# Patient Record
Sex: FEMALE | Race: WHITE | NOT HISPANIC OR LATINO | Employment: FULL TIME | URBAN - METROPOLITAN AREA
[De-identification: names, ages, dates, MRNs, and addresses within clinical notes are randomized per-mention and may not be internally consistent; named-entity substitution may affect disease eponyms.]

---

## 2017-03-20 ENCOUNTER — OFFICE VISIT (OUTPATIENT)
Dept: URGENT CARE | Facility: CLINIC | Age: 47
End: 2017-03-20
Payer: COMMERCIAL

## 2017-03-20 PROCEDURE — 99203 OFFICE O/P NEW LOW 30 MIN: CPT

## 2018-04-04 LAB
T4 FREE SERPL-MCNC: 1.08 NG/DL (ref 0.6–1.7)
TSH SERPL DL<=0.05 MIU/L-ACNC: 6.15 UIU/M (ref 0.45–5.33)

## 2018-05-16 ENCOUNTER — TRANSCRIBE ORDERS (OUTPATIENT)
Dept: LAB | Facility: CLINIC | Age: 48
End: 2018-05-16

## 2018-05-16 ENCOUNTER — APPOINTMENT (OUTPATIENT)
Dept: LAB | Facility: CLINIC | Age: 48
End: 2018-05-16
Payer: COMMERCIAL

## 2018-05-16 DIAGNOSIS — D64.9 ANEMIA, UNSPECIFIED TYPE: Primary | ICD-10-CM

## 2018-05-16 DIAGNOSIS — Z79.899 NEED FOR PROPHYLACTIC CHEMOTHERAPY: ICD-10-CM

## 2018-05-16 DIAGNOSIS — D64.9 ANEMIA, UNSPECIFIED TYPE: ICD-10-CM

## 2018-05-16 LAB
ALBUMIN SERPL BCP-MCNC: 3.4 G/DL (ref 3.5–5)
ALP SERPL-CCNC: 75 U/L (ref 46–116)
ALT SERPL W P-5'-P-CCNC: 29 U/L (ref 12–78)
ANION GAP SERPL CALCULATED.3IONS-SCNC: 8 MMOL/L (ref 4–13)
AST SERPL W P-5'-P-CCNC: 14 U/L (ref 5–45)
BASOPHILS # BLD AUTO: 0.05 THOUSANDS/ΜL (ref 0–0.1)
BASOPHILS NFR BLD AUTO: 1 % (ref 0–1)
BILIRUB SERPL-MCNC: 0.32 MG/DL (ref 0.2–1)
BUN SERPL-MCNC: 17 MG/DL (ref 5–25)
CALCIUM SERPL-MCNC: 9.2 MG/DL (ref 8.3–10.1)
CHLORIDE SERPL-SCNC: 104 MMOL/L (ref 100–108)
CO2 SERPL-SCNC: 25 MMOL/L (ref 21–32)
CREAT SERPL-MCNC: 0.79 MG/DL (ref 0.6–1.3)
EOSINOPHIL # BLD AUTO: 0.1 THOUSAND/ΜL (ref 0–0.61)
EOSINOPHIL NFR BLD AUTO: 1 % (ref 0–6)
ERYTHROCYTE [DISTWIDTH] IN BLOOD BY AUTOMATED COUNT: 14.4 % (ref 11.6–15.1)
FERRITIN SERPL-MCNC: 7 NG/ML (ref 8–388)
GFR SERPL CREATININE-BSD FRML MDRD: 89 ML/MIN/1.73SQ M
GLUCOSE P FAST SERPL-MCNC: 76 MG/DL (ref 65–99)
HCT VFR BLD AUTO: 43.7 % (ref 34.8–46.1)
HGB BLD-MCNC: 13.9 G/DL (ref 11.5–15.4)
IMM GRANULOCYTES # BLD AUTO: 0.07 THOUSAND/UL (ref 0–0.2)
IMM GRANULOCYTES NFR BLD AUTO: 1 % (ref 0–2)
IRON SATN MFR SERPL: 82 %
IRON SERPL-MCNC: 326 UG/DL (ref 50–170)
LYMPHOCYTES # BLD AUTO: 2.67 THOUSANDS/ΜL (ref 0.6–4.47)
LYMPHOCYTES NFR BLD AUTO: 24 % (ref 14–44)
MCH RBC QN AUTO: 29.2 PG (ref 26.8–34.3)
MCHC RBC AUTO-ENTMCNC: 31.8 G/DL (ref 31.4–37.4)
MCV RBC AUTO: 92 FL (ref 82–98)
MONOCYTES # BLD AUTO: 0.54 THOUSAND/ΜL (ref 0.17–1.22)
MONOCYTES NFR BLD AUTO: 5 % (ref 4–12)
NEUTROPHILS # BLD AUTO: 7.66 THOUSANDS/ΜL (ref 1.85–7.62)
NEUTS SEG NFR BLD AUTO: 69 % (ref 43–75)
NRBC BLD AUTO-RTO: 0 /100 WBCS
PLATELET # BLD AUTO: 315 THOUSANDS/UL (ref 149–390)
PMV BLD AUTO: 10.3 FL (ref 8.9–12.7)
POTASSIUM SERPL-SCNC: 4.4 MMOL/L (ref 3.5–5.3)
PROT SERPL-MCNC: 7.4 G/DL (ref 6.4–8.2)
RBC # BLD AUTO: 4.76 MILLION/UL (ref 3.81–5.12)
SODIUM SERPL-SCNC: 137 MMOL/L (ref 136–145)
TIBC SERPL-MCNC: 399 UG/DL (ref 250–450)
WBC # BLD AUTO: 11.09 THOUSAND/UL (ref 4.31–10.16)

## 2018-05-16 PROCEDURE — 83550 IRON BINDING TEST: CPT

## 2018-05-16 PROCEDURE — 85025 COMPLETE CBC W/AUTO DIFF WBC: CPT

## 2018-05-16 PROCEDURE — 83540 ASSAY OF IRON: CPT

## 2018-05-16 PROCEDURE — 36415 COLL VENOUS BLD VENIPUNCTURE: CPT

## 2018-05-16 PROCEDURE — 80053 COMPREHEN METABOLIC PANEL: CPT

## 2018-05-16 PROCEDURE — 82747 ASSAY OF FOLIC ACID RBC: CPT

## 2018-05-16 PROCEDURE — 82728 ASSAY OF FERRITIN: CPT

## 2018-05-18 LAB
FOLATE BLD-MCNC: 514 NG/ML
FOLATE RBC-MCNC: 1266 NG/ML
HCT VFR BLD AUTO: 40.6 % (ref 34–46.6)

## 2019-01-14 ENCOUNTER — TRANSCRIBE ORDERS (OUTPATIENT)
Dept: LAB | Facility: CLINIC | Age: 49
End: 2019-01-14

## 2019-01-14 ENCOUNTER — APPOINTMENT (OUTPATIENT)
Dept: LAB | Facility: CLINIC | Age: 49
End: 2019-01-14
Payer: COMMERCIAL

## 2019-01-14 DIAGNOSIS — E10.8 TYPE 1 DIABETES MELLITUS WITH COMPLICATION (HCC): ICD-10-CM

## 2019-01-14 DIAGNOSIS — E55.9 AVITAMINOSIS D: ICD-10-CM

## 2019-01-14 DIAGNOSIS — R53.83 FATIGUE, UNSPECIFIED TYPE: Primary | ICD-10-CM

## 2019-01-14 DIAGNOSIS — Z79.899 NEED FOR PROPHYLACTIC CHEMOTHERAPY: ICD-10-CM

## 2019-01-14 DIAGNOSIS — R53.83 FATIGUE, UNSPECIFIED TYPE: ICD-10-CM

## 2019-01-14 DIAGNOSIS — D64.9 ANEMIA, UNSPECIFIED TYPE: ICD-10-CM

## 2019-01-14 LAB
25(OH)D3 SERPL-MCNC: 22 NG/ML (ref 30–100)
ALBUMIN SERPL BCP-MCNC: 3.7 G/DL (ref 3.5–5)
ALP SERPL-CCNC: 65 U/L (ref 46–116)
ALT SERPL W P-5'-P-CCNC: 24 U/L (ref 12–78)
ANION GAP SERPL CALCULATED.3IONS-SCNC: 6 MMOL/L (ref 4–13)
AST SERPL W P-5'-P-CCNC: 13 U/L (ref 5–45)
BILIRUB SERPL-MCNC: 0.45 MG/DL (ref 0.2–1)
BUN SERPL-MCNC: 17 MG/DL (ref 5–25)
CALCIUM SERPL-MCNC: 8.6 MG/DL (ref 8.3–10.1)
CHLORIDE SERPL-SCNC: 105 MMOL/L (ref 100–108)
CK SERPL-CCNC: 55 U/L (ref 26–192)
CO2 SERPL-SCNC: 24 MMOL/L (ref 21–32)
CREAT SERPL-MCNC: 0.79 MG/DL (ref 0.6–1.3)
ERYTHROCYTE [DISTWIDTH] IN BLOOD BY AUTOMATED COUNT: 12.7 % (ref 11.6–15.1)
ERYTHROCYTE [SEDIMENTATION RATE] IN BLOOD: 11 MM/HOUR (ref 0–20)
EST. AVERAGE GLUCOSE BLD GHB EST-MCNC: 97 MG/DL
FERRITIN SERPL-MCNC: 8 NG/ML (ref 8–388)
GFR SERPL CREATININE-BSD FRML MDRD: 88 ML/MIN/1.73SQ M
GLUCOSE SERPL-MCNC: 80 MG/DL (ref 65–140)
HBA1C MFR BLD: 5 % (ref 4.2–6.3)
HCT VFR BLD AUTO: 40.8 % (ref 34.8–46.1)
HGB BLD-MCNC: 14.2 G/DL (ref 11.5–15.4)
IRON SATN MFR SERPL: 15 %
IRON SERPL-MCNC: 58 UG/DL (ref 50–170)
MCH RBC QN AUTO: 33.3 PG (ref 26.8–34.3)
MCHC RBC AUTO-ENTMCNC: 34.8 G/DL (ref 31.4–37.4)
MCV RBC AUTO: 96 FL (ref 82–98)
PLATELET # BLD AUTO: 313 THOUSANDS/UL (ref 149–390)
PMV BLD AUTO: 10.2 FL (ref 8.9–12.7)
POTASSIUM SERPL-SCNC: 3.9 MMOL/L (ref 3.5–5.3)
PROT SERPL-MCNC: 7.2 G/DL (ref 6.4–8.2)
RBC # BLD AUTO: 4.27 MILLION/UL (ref 3.81–5.12)
SODIUM SERPL-SCNC: 135 MMOL/L (ref 136–145)
T4 FREE SERPL-MCNC: 1.51 NG/DL (ref 0.76–1.46)
TIBC SERPL-MCNC: 387 UG/DL (ref 250–450)
TSH SERPL DL<=0.05 MIU/L-ACNC: 1.12 UIU/ML (ref 0.36–3.74)
WBC # BLD AUTO: 9.41 THOUSAND/UL (ref 4.31–10.16)

## 2019-01-14 PROCEDURE — 83550 IRON BINDING TEST: CPT

## 2019-01-14 PROCEDURE — 84439 ASSAY OF FREE THYROXINE: CPT

## 2019-01-14 PROCEDURE — 82550 ASSAY OF CK (CPK): CPT

## 2019-01-14 PROCEDURE — 85652 RBC SED RATE AUTOMATED: CPT

## 2019-01-14 PROCEDURE — 85027 COMPLETE CBC AUTOMATED: CPT

## 2019-01-14 PROCEDURE — 86618 LYME DISEASE ANTIBODY: CPT

## 2019-01-14 PROCEDURE — 80053 COMPREHEN METABOLIC PANEL: CPT

## 2019-01-14 PROCEDURE — 83036 HEMOGLOBIN GLYCOSYLATED A1C: CPT

## 2019-01-14 PROCEDURE — 83540 ASSAY OF IRON: CPT

## 2019-01-14 PROCEDURE — 82747 ASSAY OF FOLIC ACID RBC: CPT

## 2019-01-14 PROCEDURE — 82306 VITAMIN D 25 HYDROXY: CPT

## 2019-01-14 PROCEDURE — 36415 COLL VENOUS BLD VENIPUNCTURE: CPT

## 2019-01-14 PROCEDURE — 86617 LYME DISEASE ANTIBODY: CPT

## 2019-01-14 PROCEDURE — 84443 ASSAY THYROID STIM HORMONE: CPT

## 2019-01-14 PROCEDURE — 82728 ASSAY OF FERRITIN: CPT

## 2019-01-16 LAB
B BURGDOR IGG SER IA-ACNC: 0.02
B BURGDOR IGM SER IA-ACNC: 0.85
FOLATE BLD-MCNC: 499.1 NG/ML
FOLATE RBC-MCNC: 1283 NG/ML
HCT VFR BLD AUTO: 38.9 % (ref 34–46.6)

## 2019-01-17 LAB
B BURGDOR IGG PATRN SER IB-IMP: NEGATIVE
B BURGDOR IGM PATRN SER IB-IMP: NEGATIVE
B BURGDOR18KD IGG SER QL IB: NORMAL
B BURGDOR23KD IGG SER QL IB: NORMAL
B BURGDOR23KD IGM SER QL IB: NORMAL
B BURGDOR28KD IGG SER QL IB: NORMAL
B BURGDOR30KD IGG SER QL IB: NORMAL
B BURGDOR39KD IGG SER QL IB: NORMAL
B BURGDOR39KD IGM SER QL IB: NORMAL
B BURGDOR41KD IGG SER QL IB: NORMAL
B BURGDOR41KD IGM SER QL IB: NORMAL
B BURGDOR45KD IGG SER QL IB: NORMAL
B BURGDOR58KD IGG SER QL IB: NORMAL
B BURGDOR66KD IGG SER QL IB: NORMAL
B BURGDOR93KD IGG SER QL IB: NORMAL

## 2019-11-20 ENCOUNTER — HOSPITAL ENCOUNTER (INPATIENT)
Facility: HOSPITAL | Age: 49
LOS: 7 days | Discharge: HOME | DRG: 881 | End: 2019-11-27
Attending: EMERGENCY MEDICINE | Admitting: PSYCHIATRY & NEUROLOGY
Payer: COMMERCIAL

## 2019-11-20 DIAGNOSIS — I10 HYPERTENSION: ICD-10-CM

## 2019-11-20 DIAGNOSIS — E55.9 VITAMIN D DEFICIENCY: ICD-10-CM

## 2019-11-20 DIAGNOSIS — Z00.8 MEDICAL CLEARANCE FOR PSYCHIATRIC ADMISSION: ICD-10-CM

## 2019-11-20 DIAGNOSIS — K59.00 CONSTIPATION: ICD-10-CM

## 2019-11-20 DIAGNOSIS — E03.9 HYPOTHYROID: ICD-10-CM

## 2019-11-20 DIAGNOSIS — Z00.8 MEDICAL CLEARANCE FOR PSYCHIATRIC ADMISSION: Primary | ICD-10-CM

## 2019-11-20 DIAGNOSIS — F32.2 CURRENT SEVERE EPISODE OF MAJOR DEPRESSIVE DISORDER WITHOUT PSYCHOTIC FEATURES WITHOUT PRIOR EPISODE (HCC): Primary | ICD-10-CM

## 2019-11-20 DIAGNOSIS — F19.10 SUBSTANCE ABUSE (HCC): ICD-10-CM

## 2019-11-20 DIAGNOSIS — F32.A DEPRESSION: ICD-10-CM

## 2019-11-20 DIAGNOSIS — R45.851 SUICIDAL IDEATIONS: ICD-10-CM

## 2019-11-20 LAB
ALBUMIN SERPL BCP-MCNC: 3.9 G/DL (ref 3.5–5.7)
ALP SERPL-CCNC: 60 U/L (ref 40–150)
ALT SERPL W P-5'-P-CCNC: 16 U/L (ref 7–52)
AMPHETAMINES SERPL QL SCN: POSITIVE
ANION GAP SERPL CALCULATED.3IONS-SCNC: 5 MMOL/L (ref 4–13)
AST SERPL W P-5'-P-CCNC: 11 U/L (ref 13–39)
BARBITURATES UR QL: NEGATIVE
BASOPHILS # BLD AUTO: 0.1 THOUSANDS/ΜL (ref 0–0.1)
BASOPHILS NFR BLD AUTO: 1 % (ref 0–2)
BENZODIAZ UR QL: POSITIVE
BILIRUB SERPL-MCNC: 0.3 MG/DL (ref 0.2–1)
BILIRUB UR QL STRIP: NEGATIVE
BUN SERPL-MCNC: 12 MG/DL (ref 7–25)
CALCIUM SERPL-MCNC: 8.8 MG/DL (ref 8.6–10.5)
CHLORIDE SERPL-SCNC: 104 MMOL/L (ref 98–107)
CLARITY UR: ABNORMAL
CO2 SERPL-SCNC: 30 MMOL/L (ref 21–31)
COCAINE UR QL: NEGATIVE
COLOR UR: YELLOW
CREAT SERPL-MCNC: 0.76 MG/DL (ref 0.6–1.2)
EOSINOPHIL # BLD AUTO: 0 THOUSAND/ΜL (ref 0–0.61)
EOSINOPHIL NFR BLD AUTO: 1 % (ref 0–5)
ERYTHROCYTE [DISTWIDTH] IN BLOOD BY AUTOMATED COUNT: 14 % (ref 11.5–14.5)
ETHANOL SERPL-MCNC: <10 MG/DL
EXT PREG TEST URINE: NEGATIVE
EXT. CONTROL ED NAV: NORMAL
GFR SERPL CREATININE-BSD FRML MDRD: 92 ML/MIN/1.73SQ M
GLUCOSE SERPL-MCNC: 87 MG/DL (ref 65–99)
GLUCOSE UR STRIP-MCNC: NEGATIVE MG/DL
HCT VFR BLD AUTO: 40.8 % (ref 42–47)
HGB BLD-MCNC: 14 G/DL (ref 12–16)
HGB UR QL STRIP.AUTO: NEGATIVE
KETONES UR STRIP-MCNC: NEGATIVE MG/DL
LEUKOCYTE ESTERASE UR QL STRIP: NEGATIVE
LYMPHOCYTES # BLD AUTO: 1.9 THOUSANDS/ΜL (ref 0.6–4.47)
LYMPHOCYTES NFR BLD AUTO: 22 % (ref 21–51)
MCH RBC QN AUTO: 31.8 PG (ref 26–34)
MCHC RBC AUTO-ENTMCNC: 34.4 G/DL (ref 31–37)
MCV RBC AUTO: 93 FL (ref 81–99)
METHADONE UR QL: NEGATIVE
MONOCYTES # BLD AUTO: 0.4 THOUSAND/ΜL (ref 0.17–1.22)
MONOCYTES NFR BLD AUTO: 5 % (ref 2–12)
NEUTROPHILS # BLD AUTO: 6.3 THOUSANDS/ΜL (ref 1.4–6.5)
NEUTS SEG NFR BLD AUTO: 72 % (ref 42–75)
NITRITE UR QL STRIP: NEGATIVE
OPIATES UR QL SCN: NEGATIVE
PCP UR QL: NEGATIVE
PH UR STRIP.AUTO: 8 [PH]
PLATELET # BLD AUTO: 296 THOUSANDS/UL (ref 149–390)
PMV BLD AUTO: 7.4 FL (ref 8.6–11.7)
POTASSIUM SERPL-SCNC: 3.8 MMOL/L (ref 3.5–5.5)
PROT SERPL-MCNC: 6.4 G/DL (ref 6.4–8.9)
PROT UR STRIP-MCNC: NEGATIVE MG/DL
RBC # BLD AUTO: 4.41 MILLION/UL (ref 3.9–5.2)
SODIUM SERPL-SCNC: 139 MMOL/L (ref 134–143)
SP GR UR STRIP.AUTO: 1.01 (ref 1–1.03)
THC UR QL: NEGATIVE
TSH SERPL DL<=0.05 MIU/L-ACNC: 0.03 UIU/ML (ref 0.45–5.33)
UROBILINOGEN UR QL STRIP.AUTO: 0.2 E.U./DL
WBC # BLD AUTO: 8.7 THOUSAND/UL (ref 4.8–10.8)

## 2019-11-20 PROCEDURE — 99285 EMERGENCY DEPT VISIT HI MDM: CPT

## 2019-11-20 PROCEDURE — 99285 EMERGENCY DEPT VISIT HI MDM: CPT | Performed by: EMERGENCY MEDICINE

## 2019-11-20 PROCEDURE — 80053 COMPREHEN METABOLIC PANEL: CPT

## 2019-11-20 PROCEDURE — G0480 DRUG TEST DEF 1-7 CLASSES: HCPCS

## 2019-11-20 PROCEDURE — 80320 DRUG SCREEN QUANTALCOHOLS: CPT

## 2019-11-20 PROCEDURE — 81003 URINALYSIS AUTO W/O SCOPE: CPT

## 2019-11-20 PROCEDURE — 80307 DRUG TEST PRSMV CHEM ANLYZR: CPT | Performed by: INTERNAL MEDICINE

## 2019-11-20 PROCEDURE — 84443 ASSAY THYROID STIM HORMONE: CPT

## 2019-11-20 PROCEDURE — 81025 URINE PREGNANCY TEST: CPT | Performed by: INTERNAL MEDICINE

## 2019-11-20 PROCEDURE — 85025 COMPLETE CBC W/AUTO DIFF WBC: CPT

## 2019-11-20 PROCEDURE — 93005 ELECTROCARDIOGRAM TRACING: CPT

## 2019-11-20 PROCEDURE — 36415 COLL VENOUS BLD VENIPUNCTURE: CPT

## 2019-11-20 RX ORDER — HYDROXYZINE HYDROCHLORIDE 25 MG/1
25 TABLET, FILM COATED ORAL EVERY 6 HOURS PRN
Status: CANCELLED | OUTPATIENT
Start: 2019-11-20

## 2019-11-20 RX ORDER — MAGNESIUM HYDROXIDE/ALUMINUM HYDROXICE/SIMETHICONE 120; 1200; 1200 MG/30ML; MG/30ML; MG/30ML
30 SUSPENSION ORAL EVERY 4 HOURS PRN
Status: CANCELLED | OUTPATIENT
Start: 2019-11-20

## 2019-11-20 RX ORDER — TRAZODONE HYDROCHLORIDE 50 MG/1
50 TABLET ORAL
Status: DISCONTINUED | OUTPATIENT
Start: 2019-11-20 | End: 2019-11-27 | Stop reason: HOSPADM

## 2019-11-20 RX ORDER — ALPRAZOLAM 0.5 MG/1
TABLET ORAL 3 TIMES DAILY PRN
Status: ON HOLD | COMMUNITY
End: 2019-11-26 | Stop reason: SDUPTHER

## 2019-11-20 RX ORDER — BENZTROPINE MESYLATE 1 MG/ML
1 INJECTION INTRAMUSCULAR; INTRAVENOUS EVERY 6 HOURS PRN
Status: DISCONTINUED | OUTPATIENT
Start: 2019-11-20 | End: 2019-11-27 | Stop reason: HOSPADM

## 2019-11-20 RX ORDER — IBUPROFEN 400 MG/1
800 TABLET ORAL EVERY 8 HOURS PRN
Status: CANCELLED | OUTPATIENT
Start: 2019-11-20

## 2019-11-20 RX ORDER — HALOPERIDOL 5 MG
5 TABLET ORAL EVERY 6 HOURS PRN
Status: DISCONTINUED | OUTPATIENT
Start: 2019-11-20 | End: 2019-11-27 | Stop reason: HOSPADM

## 2019-11-20 RX ORDER — BENZTROPINE MESYLATE 1 MG/1
1 TABLET ORAL EVERY 6 HOURS PRN
Status: CANCELLED | OUTPATIENT
Start: 2019-11-20

## 2019-11-20 RX ORDER — LEVOTHYROXINE SODIUM 0.07 MG/1
150 TABLET ORAL
Status: DISCONTINUED | OUTPATIENT
Start: 2019-11-21 | End: 2019-11-21

## 2019-11-20 RX ORDER — ACETAMINOPHEN 325 MG/1
650 TABLET ORAL EVERY 6 HOURS PRN
Status: DISCONTINUED | OUTPATIENT
Start: 2019-11-20 | End: 2019-11-27 | Stop reason: HOSPADM

## 2019-11-20 RX ORDER — RISPERIDONE 1 MG/1
1 TABLET, ORALLY DISINTEGRATING ORAL EVERY 6 HOURS PRN
Status: DISCONTINUED | OUTPATIENT
Start: 2019-11-20 | End: 2019-11-27 | Stop reason: HOSPADM

## 2019-11-20 RX ORDER — IBUPROFEN 800 MG/1
800 TABLET ORAL EVERY 8 HOURS PRN
Status: DISCONTINUED | OUTPATIENT
Start: 2019-11-20 | End: 2019-11-27 | Stop reason: HOSPADM

## 2019-11-20 RX ORDER — HYDROXYZINE HYDROCHLORIDE 25 MG/1
25 TABLET, FILM COATED ORAL EVERY 6 HOURS PRN
Status: DISCONTINUED | OUTPATIENT
Start: 2019-11-20 | End: 2019-11-21

## 2019-11-20 RX ORDER — BENZTROPINE MESYLATE 1 MG/1
1 TABLET ORAL EVERY 6 HOURS PRN
Status: DISCONTINUED | OUTPATIENT
Start: 2019-11-20 | End: 2019-11-27 | Stop reason: HOSPADM

## 2019-11-20 RX ORDER — HALOPERIDOL 5 MG
5 TABLET ORAL EVERY 6 HOURS PRN
Status: CANCELLED | OUTPATIENT
Start: 2019-11-20

## 2019-11-20 RX ORDER — ACETAMINOPHEN 325 MG/1
650 TABLET ORAL EVERY 6 HOURS PRN
Status: CANCELLED | OUTPATIENT
Start: 2019-11-20

## 2019-11-20 RX ORDER — IBUPROFEN 600 MG/1
600 TABLET ORAL EVERY 8 HOURS PRN
Status: DISCONTINUED | OUTPATIENT
Start: 2019-11-20 | End: 2019-11-27 | Stop reason: HOSPADM

## 2019-11-20 RX ORDER — LEVOTHYROXINE SODIUM 0.15 MG/1
150 TABLET ORAL DAILY
COMMUNITY
End: 2019-11-27 | Stop reason: HOSPADM

## 2019-11-20 RX ORDER — TRAZODONE HYDROCHLORIDE 50 MG/1
50 TABLET ORAL
Status: CANCELLED | OUTPATIENT
Start: 2019-11-20

## 2019-11-20 RX ORDER — IBUPROFEN 600 MG/1
600 TABLET ORAL EVERY 8 HOURS PRN
Status: CANCELLED | OUTPATIENT
Start: 2019-11-20

## 2019-11-20 RX ORDER — MAGNESIUM HYDROXIDE/ALUMINUM HYDROXICE/SIMETHICONE 120; 1200; 1200 MG/30ML; MG/30ML; MG/30ML
30 SUSPENSION ORAL EVERY 4 HOURS PRN
Status: DISCONTINUED | OUTPATIENT
Start: 2019-11-20 | End: 2019-11-27 | Stop reason: HOSPADM

## 2019-11-20 RX ORDER — LISINOPRIL 20 MG/1
20 TABLET ORAL
Status: ON HOLD | COMMUNITY
End: 2019-11-26 | Stop reason: SDUPTHER

## 2019-11-20 RX ORDER — BENZTROPINE MESYLATE 1 MG/ML
1 INJECTION INTRAMUSCULAR; INTRAVENOUS EVERY 6 HOURS PRN
Status: CANCELLED | OUTPATIENT
Start: 2019-11-20

## 2019-11-20 RX ORDER — RISPERIDONE 1 MG/1
1 TABLET, ORALLY DISINTEGRATING ORAL EVERY 6 HOURS PRN
Status: CANCELLED | OUTPATIENT
Start: 2019-11-20

## 2019-11-20 RX ORDER — HALOPERIDOL 5 MG/ML
5 INJECTION INTRAMUSCULAR EVERY 6 HOURS PRN
Status: DISCONTINUED | OUTPATIENT
Start: 2019-11-20 | End: 2019-11-27 | Stop reason: HOSPADM

## 2019-11-20 RX ORDER — LISINOPRIL 20 MG/1
20 TABLET ORAL
Status: DISCONTINUED | OUTPATIENT
Start: 2019-11-20 | End: 2019-11-27 | Stop reason: HOSPADM

## 2019-11-20 RX ORDER — HALOPERIDOL 5 MG/ML
5 INJECTION INTRAMUSCULAR EVERY 6 HOURS PRN
Status: CANCELLED | OUTPATIENT
Start: 2019-11-20

## 2019-11-20 RX ADMIN — TRAZODONE HYDROCHLORIDE 50 MG: 50 TABLET ORAL at 22:35

## 2019-11-20 RX ADMIN — LISINOPRIL 20 MG: 20 TABLET ORAL at 22:35

## 2019-11-20 RX ADMIN — HYDROXYZINE HYDROCHLORIDE 25 MG: 25 TABLET ORAL at 23:55

## 2019-11-20 NOTE — ED NOTES
Anabel Frausto from crisis is in at bedside with pt for eval and intake at this time       Miguel Valdes  11/20/19 6929

## 2019-11-20 NOTE — ED PROVIDER NOTES
Pt Name: Joe Watters  MRN: [id number]  [place] 1970  Age/Sex: 47 y o  female  Date of evaluation: 2019  PCP: Wicho Bryan MD    CHIEF COMPLAINT    Chief Complaint   Patient presents with    Psychiatric Evaluation     Patient greiving daughter and   both of which  in the past 4 years  patient is contemplating suicide by overdose  HPI    Alexa Randle presents to the Emergency Department complaining of feeling depressed and suicidal   She lost her 12year old daughter in a car accident  Then she lost her  to a drug overdose  Since then she has been seeing a sarah who is using drugs and she has been recreationally using meth with him  HPI      Past Medical and Surgical History    Past Medical History:   Diagnosis Date    Disease of thyroid gland     Hypertension        History reviewed  No pertinent surgical history  History reviewed  No pertinent family history  Social History     Tobacco Use    Smoking status: Current Every Day Smoker     Types: E-Cigarettes    Smokeless tobacco: Never Used   Substance Use Topics    Alcohol use: Yes     Comment: social    Drug use: Yes     Types: Methamphetamines     Comment: last used 2 days ago           Allergies    No Known Allergies    Home Medications    Prior to Admission medications    Not on File           Review of Systems    Review of Systems   Constitutional: Negative for activity change, appetite change, chills, diaphoresis, fatigue and fever  HENT: Negative for congestion, postnasal drip, rhinorrhea, sinus pressure, sneezing and sore throat  Eyes: Negative for pain and visual disturbance  Respiratory: Negative for cough, chest tightness and shortness of breath  Cardiovascular: Negative for chest pain, palpitations and leg swelling  Gastrointestinal: Negative for abdominal distention, abdominal pain, constipation, diarrhea, nausea and vomiting     Endocrine: Negative for polydipsia, polyphagia and polyuria  Genitourinary: Negative for decreased urine volume, difficulty urinating, dysuria, flank pain, frequency and hematuria  Musculoskeletal: Negative for arthralgias, gait problem, joint swelling and neck pain  Skin: Negative for pallor and rash  Allergic/Immunologic: Negative for immunocompromised state  Neurological: Negative for syncope, speech difficulty, weakness, light-headedness, numbness and headaches  Psychiatric/Behavioral: Positive for suicidal ideas  The patient is nervous/anxious  All other systems reviewed and are negative  Physical Exam      ED Triage Vitals [11/20/19 1622]   Temperature Pulse Respirations Blood Pressure SpO2   98 1 °F (36 7 °C) 100 16 150/81 100 %      Temp Source Heart Rate Source Patient Position - Orthostatic VS BP Location FiO2 (%)   Temporal Monitor Sitting Left arm --      Pain Score       No Pain               Physical Exam   Constitutional: She is oriented to person, place, and time  She appears well-developed and well-nourished  No distress  HENT:   Head: Normocephalic and atraumatic  Nose: Nose normal    Mouth/Throat: Oropharynx is clear and moist    Eyes: Pupils are equal, round, and reactive to light  Conjunctivae, EOM and lids are normal    Neck: Normal range of motion  Neck supple  Cardiovascular: Normal rate, regular rhythm and normal heart sounds  Exam reveals no gallop and no friction rub  No murmur heard  Pulmonary/Chest: Effort normal and breath sounds normal  No accessory muscle usage  No respiratory distress  She has no wheezes  She has no rales  Abdominal: Soft  She exhibits no distension  There is no tenderness  There is no rebound and no guarding  Neurological: She is alert and oriented to person, place, and time  No cranial nerve deficit or sensory deficit  Skin: Skin is warm and dry  No rash noted  She is not diaphoretic  No erythema     Psychiatric: Her speech is normal and behavior is normal  Judgment normal  Her mood appears anxious  She exhibits a depressed mood  She expresses suicidal ideation  She expresses suicidal plans  Nursing note and vitals reviewed  Assessment and Plan    Angelia Tamez is a 47 y o  female who presents with depression and suicidal thoughts  Physical examination unremarkable  Based on evaluation patient is at risk for self-harm  Patient is willing to sign 201  Would need 302 commitment if unwilling to continue with 201  Diagnostics reviewed and patient is medically cleared  Crisis has been contacted and their evaluation is pending  Care of patient transferred to incoming team       MDM  Number of Diagnoses or Management Options  Diagnosis management comments: Patient is medically cleared for inpatient psychiatric evaluation and treatment  Diagnostic Results    EKG:  normal EKG, normal sinus rhythm    EKG INTERPRETATION  EKG Interpretation    Rate: 97 BPM  Rhythm: sinus  Axis: normal  Intervals: Normal, no blocks, QTc 449ms  Q waves: normal  T waves: normal  ST segments: normal    Impression: normal EKG  EKG for comparison: not immediately available  EKG interpreted by me  Interpretation by Yelena Leach DO  EKG reviewed and interpreted independently      Labs:    Results for orders placed or performed during the hospital encounter of 11/20/19   Rapid drug screen, urine   Result Value Ref Range    Amph/Meth UR Positive (A) Negative    Barbiturate Ur Negative Negative    Benzodiazepine Urine Positive (A) Negative    Cocaine Urine Negative Negative    Methadone Urine Negative Negative    Opiate Urine Negative Negative    PCP Ur Negative Negative    THC Urine Negative Negative   UA (URINE) with reflex to Scope   Result Value Ref Range    Color, UA Yellow Yellow, Straw    Clarity, UA Slightly Cloudy (A) Hazy, Clear    Specific Gravity, UA 1 015 >1 005-<1 030    pH, UA 8 0 (A) 5 0, 5 5, 6 0, 6 5, 7 0, 7 5    Leukocytes, UA Negative Negative    Nitrite, UA Negative Negative    Protein, UA Negative Negative, Interference- unable to analyze mg/dl    Glucose, UA Negative Negative mg/dl    Ketones, UA Negative Negative mg/dl    Urobilinogen, UA 0 2 0 2, 1 0 E U /dl E U /dl    Bilirubin, UA Negative Negative    Blood, UA Negative Negative   Ethanol   Result Value Ref Range    Ethanol Lvl <10 <10 mg/dL   TSH   Result Value Ref Range    TSH 3RD GENERATON 0 030 (L) 0 450 - 5 330 uIU/mL   CBC and differential   Result Value Ref Range    WBC 8 70 4 80 - 10 80 Thousand/uL    RBC 4 41 3 90 - 5 20 Million/uL    Hemoglobin 14 0 12 0 - 16 0 g/dL    Hematocrit 40 8 (L) 42 0 - 47 0 %    MCV 93 81 - 99 fL    MCH 31 8 26 0 - 34 0 pg    MCHC 34 4 31 0 - 37 0 g/dL    RDW 14 0 11 5 - 14 5 %    MPV 7 4 (L) 8 6 - 11 7 fL    Platelets 535 969 - 231 Thousands/uL    Neutrophils Relative 72 42 - 75 %    Lymphocytes Relative 22 21 - 51 %    Monocytes Relative 5 2 - 12 %    Eosinophils Relative 1 0 - 5 %    Basophils Relative 1 0 - 2 %    Neutrophils Absolute 6 30 1 40 - 6 50 Thousands/µL    Lymphocytes Absolute 1 90 0 60 - 4 47 Thousands/µL    Monocytes Absolute 0 40 0 17 - 1 22 Thousand/µL    Eosinophils Absolute 0 00 0 00 - 0 61 Thousand/µL    Basophils Absolute 0 10 0 00 - 0 10 Thousands/µL   Comprehensive metabolic panel   Result Value Ref Range    Sodium 139 134 - 143 mmol/L    Potassium 3 8 3 5 - 5 5 mmol/L    Chloride 104 98 - 107 mmol/L    CO2 30 21 - 31 mmol/L    ANION GAP 5 4 - 13 mmol/L    BUN 12 7 - 25 mg/dL    Creatinine 0 76 0 60 - 1 20 mg/dL    Glucose 87 65 - 99 mg/dL    Calcium 8 8 8 6 - 10 5 mg/dL    AST 11 (L) 13 - 39 U/L    ALT 16 7 - 52 U/L    Alkaline Phosphatase 60 40 - 150 U/L    Total Protein 6 4 6 4 - 8 9 g/dL    Albumin 3 9 3 5 - 5 7 g/dL    Total Bilirubin 0 30 0 20 - 1 00 mg/dL    eGFR 92 ml/min/1 73sq m   POCT pregnancy, urine   Result Value Ref Range    EXT PREG TEST UR (Ref: Negative) negative     Control valid        All labs reviewed and utilized in the medical decision making process    Radiology:    No orders to display       All radiology studies independently viewed by me and interpreted by the radiologist     Procedure    Procedures      ED Course of Care and Re-Assessments        Medications - No data to display        FINAL IMPRESSION    Final diagnoses:   Depression   Suicidal ideations   Substance abuse ([facility] )            Hannah Mcclure, [address] [facility]  11/25/19 5859

## 2019-11-21 PROBLEM — F13.10 BENZODIAZEPINE ABUSE (HCC): Status: ACTIVE | Noted: 2019-11-21

## 2019-11-21 PROBLEM — F15.90 STIMULANT USE DISORDER: Status: ACTIVE | Noted: 2019-11-21

## 2019-11-21 PROBLEM — F32.A DEPRESSION: Status: ACTIVE | Noted: 2019-11-21

## 2019-11-21 LAB
ALBUMIN SERPL BCP-MCNC: 3.6 G/DL (ref 3.5–5.7)
ALP SERPL-CCNC: 55 U/L (ref 40–150)
ALT SERPL W P-5'-P-CCNC: 15 U/L (ref 7–52)
ANION GAP SERPL CALCULATED.3IONS-SCNC: 6 MMOL/L (ref 4–13)
AST SERPL W P-5'-P-CCNC: 10 U/L (ref 13–39)
ATRIAL RATE: 97 BPM
BASOPHILS # BLD AUTO: 0.1 THOUSANDS/ΜL (ref 0–0.1)
BASOPHILS NFR BLD AUTO: 1 % (ref 0–2)
BILIRUB SERPL-MCNC: 0.4 MG/DL (ref 0.2–1)
BUN SERPL-MCNC: 12 MG/DL (ref 7–25)
CALCIUM SERPL-MCNC: 8.8 MG/DL (ref 8.6–10.5)
CHLORIDE SERPL-SCNC: 107 MMOL/L (ref 98–107)
CHOLEST SERPL-MCNC: 150 MG/DL (ref 0–200)
CO2 SERPL-SCNC: 27 MMOL/L (ref 21–31)
CREAT SERPL-MCNC: 0.66 MG/DL (ref 0.6–1.2)
EOSINOPHIL # BLD AUTO: 0.1 THOUSAND/ΜL (ref 0–0.61)
EOSINOPHIL NFR BLD AUTO: 2 % (ref 0–5)
ERYTHROCYTE [DISTWIDTH] IN BLOOD BY AUTOMATED COUNT: 14 % (ref 11.5–14.5)
GFR SERPL CREATININE-BSD FRML MDRD: 103 ML/MIN/1.73SQ M
GLUCOSE SERPL-MCNC: 85 MG/DL (ref 65–99)
HCT VFR BLD AUTO: 39.3 % (ref 42–47)
HDLC SERPL-MCNC: 46 MG/DL
HGB BLD-MCNC: 13.6 G/DL (ref 12–16)
LDLC SERPL CALC-MCNC: 83 MG/DL (ref 0–100)
LYMPHOCYTES # BLD AUTO: 3.1 THOUSANDS/ΜL (ref 0.6–4.47)
LYMPHOCYTES NFR BLD AUTO: 33 % (ref 21–51)
MCH RBC QN AUTO: 31.9 PG (ref 26–34)
MCHC RBC AUTO-ENTMCNC: 34.6 G/DL (ref 31–37)
MCV RBC AUTO: 92 FL (ref 81–99)
MONOCYTES # BLD AUTO: 0.5 THOUSAND/ΜL (ref 0.17–1.22)
MONOCYTES NFR BLD AUTO: 5 % (ref 2–12)
NEUTROPHILS # BLD AUTO: 5.5 THOUSANDS/ΜL (ref 1.4–6.5)
NEUTS SEG NFR BLD AUTO: 59 % (ref 42–75)
NONHDLC SERPL-MCNC: 104 MG/DL
P AXIS: 55 DEGREES
PLATELET # BLD AUTO: 272 THOUSANDS/UL (ref 149–390)
PMV BLD AUTO: 7.3 FL (ref 8.6–11.7)
POTASSIUM SERPL-SCNC: 3.9 MMOL/L (ref 3.5–5.5)
PR INTERVAL: 144 MS
PROT SERPL-MCNC: 6.2 G/DL (ref 6.4–8.9)
QRS AXIS: 13 DEGREES
QRSD INTERVAL: 86 MS
QT INTERVAL: 354 MS
QTC INTERVAL: 449 MS
RBC # BLD AUTO: 4.27 MILLION/UL (ref 3.9–5.2)
RPR SER QL: NORMAL
SODIUM SERPL-SCNC: 140 MMOL/L (ref 134–143)
T WAVE AXIS: 51 DEGREES
TRIGL SERPL-MCNC: 107 MG/DL (ref 44–166)
TSH SERPL DL<=0.05 MIU/L-ACNC: 0.05 UIU/ML (ref 0.45–5.33)
VENTRICULAR RATE: 97 BPM
WBC # BLD AUTO: 9.3 THOUSAND/UL (ref 4.8–10.8)

## 2019-11-21 PROCEDURE — 99222 1ST HOSP IP/OBS MODERATE 55: CPT | Performed by: PSYCHIATRY & NEUROLOGY

## 2019-11-21 PROCEDURE — 84443 ASSAY THYROID STIM HORMONE: CPT | Performed by: PSYCHIATRY & NEUROLOGY

## 2019-11-21 PROCEDURE — 80061 LIPID PANEL: CPT | Performed by: PSYCHIATRY & NEUROLOGY

## 2019-11-21 PROCEDURE — 80053 COMPREHEN METABOLIC PANEL: CPT | Performed by: PSYCHIATRY & NEUROLOGY

## 2019-11-21 PROCEDURE — 85025 COMPLETE CBC W/AUTO DIFF WBC: CPT | Performed by: PSYCHIATRY & NEUROLOGY

## 2019-11-21 PROCEDURE — 86592 SYPHILIS TEST NON-TREP QUAL: CPT | Performed by: PSYCHIATRY & NEUROLOGY

## 2019-11-21 PROCEDURE — 93010 ELECTROCARDIOGRAM REPORT: CPT | Performed by: INTERNAL MEDICINE

## 2019-11-21 RX ORDER — ESCITALOPRAM OXALATE 10 MG/1
10 TABLET ORAL DAILY
Status: DISCONTINUED | OUTPATIENT
Start: 2019-11-21 | End: 2019-11-27 | Stop reason: HOSPADM

## 2019-11-21 RX ORDER — HYDROXYZINE HYDROCHLORIDE 25 MG/1
50 TABLET, FILM COATED ORAL EVERY 6 HOURS PRN
Status: DISCONTINUED | OUTPATIENT
Start: 2019-11-21 | End: 2019-11-27 | Stop reason: HOSPADM

## 2019-11-21 RX ORDER — GABAPENTIN 100 MG/1
100 CAPSULE ORAL 3 TIMES DAILY
Status: DISCONTINUED | OUTPATIENT
Start: 2019-11-21 | End: 2019-11-22

## 2019-11-21 RX ORDER — LORAZEPAM 0.5 MG/1
0.5 TABLET ORAL EVERY 8 HOURS PRN
Status: DISCONTINUED | OUTPATIENT
Start: 2019-11-21 | End: 2019-11-27 | Stop reason: HOSPADM

## 2019-11-21 RX ADMIN — GABAPENTIN 100 MG: 100 CAPSULE ORAL at 21:29

## 2019-11-21 RX ADMIN — HYDROXYZINE HYDROCHLORIDE 50 MG: 25 TABLET ORAL at 11:58

## 2019-11-21 RX ADMIN — ESCITALOPRAM OXALATE 10 MG: 10 TABLET ORAL at 11:58

## 2019-11-21 RX ADMIN — LISINOPRIL 20 MG: 20 TABLET ORAL at 21:29

## 2019-11-21 RX ADMIN — LEVOTHYROXINE SODIUM 150 MCG: 75 TABLET ORAL at 05:56

## 2019-11-21 RX ADMIN — HYDROXYZINE HYDROCHLORIDE 50 MG: 25 TABLET ORAL at 21:29

## 2019-11-21 NOTE — PROGRESS NOTES
Patient had difficulty with going to sleep during the early portion of the overnight hours  She received a PRN dose of Trazodone at 2235 for insomnia which was ineffective for patient  She requested medication for anxiety which was administered at 2355 as per order for mild anxiety  Her Rouse Scale rating was 11  Patient was able to sleep once medication took effect  No further complaints voiced regarding insomnia  Q7 minute safety checks in progress

## 2019-11-21 NOTE — PROGRESS NOTES
11/21/19 0949   Team Meeting   Meeting Type Daily Rounds   Initial Conference Date 11/21/19   Patient/Family Present   Patient Present No   Patient's Family Present No     Daily Psychiatric Rounds    Team Members Present:    Ana Tejeda, MT Dutta, MS,NCC,LPC  Miriam Ward, MSW  Jim case, KRYSTALS  Sang Kerr, RN  Jose Smallwood, MILLY    Discussion:     New admit reviewed  201  Positive for methamphetamines and benzodiazepines  SI with plan to OD prior to admission  Increased depression following losses  Has court Monday to maintain ownership of her home  Pt requesting outpatient services and discharge

## 2019-11-21 NOTE — PROGRESS NOTES
Dr Mimi Salgado from [facility] contacted nursing staff at [phone] to discuss patient status  It was deemed that patient does not need one to one observation while on the [facility]  Patient continues to deny SI, HI and hallucinations  She informs she will not hurt herself or anyone else and will come to staff if that would change  Q7 minute safety checks are in place

## 2019-11-21 NOTE — PROGRESS NOTES
PT and [address] [place] [address] team met to review strengths, TX plan and goals all in agreement  All signed with exception of PT whom refused to sign

## 2019-11-21 NOTE — ED NOTES
Patient is accepted at [facility]  Patient is accepted by Martha Roberto with Intake  Patient may go to the floor at 2130  Nurse report is to be called to [phone] prior to patient transfer

## 2019-11-21 NOTE — PROGRESS NOTES
Patient withdrawn to room as she feels the hospital is not the environment for her  Patient feels she would be better in outpatient counseling for grieving  Patient admits to anxiety/depression due to being in the hospital Per patient, "I said something stupid, I will never hurt myself"  Patient denies SI/HI, hallucinations however remains withdrawn to room  PRN Atarax given as ordered for anxiety with positive effect  Remains medication compliant and on 7" checks for safety and behaviors
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
## 2019-11-21 NOTE — PLAN OF CARE
Problem: Ineffective Coping  Goal: Participates in unit activities  Description  Interventions:  - Provide therapeutic environment   - Provide required programming   - Redirect inappropriate behaviors   Outcome: Not Progressing   Patient has been isolating to her room; wants to be discharged for 1:1 therapy
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
## 2019-11-21 NOTE — ED NOTES
Insurance Authorization for admission:   Phone call placed to [employer]ather  Phone number: [phone]  Spoke to [employer]      3 days approved  Level of care: I/P psych  Review on 11/22/2019  Authorization # [id number]  UR julee/ Anyi Cedillo @ [phone]    EVS (Eligibility Verification System) called - [phone]    Automated system indicates: Pt not on file per previous note    Insurance Authorization for Transportation:    No transport needed

## 2019-11-21 NOTE — PROGRESS NOTES
Patient came in with the following items:    White underpants  Tan underpants  2 pr   Black underpants  1 black bra  Black jeans  Grey sweat pants with (love)  Black stretch pants  Tan long sleeve top  [employer] strings taken out by request  Black/blue sneaker sox  2 pr of black/pink sneaker sox  Grey stripe cat sneakers  Black long sleeve sweater  Rust long sleeve sweater  Blue plaid pants  Black and white top    Toiletries:    Sensodyne, old spice deordant, ponds dry skin cream, coconut shampoo and conditioner, hair gel,     Contraband in room:   black coat  Black bag with misc clothes   Grey 31 bag cosmetics, makeup etc       vitamins sent to pharmacy
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
## 2019-11-21 NOTE — TREATMENT PLAN
TREATMENT PLAN REVIEW - [address] Tuyet Dailey 47 y o  1970 female MRN: [id number]    [facility]  Room / Bed: Libia Stafford [facility] Encounter: [id number]          Admit Date/Time:  11/20/2019  4:27 PM    Treatment Team: Attending Provider: Jaclyn Olivares MD; Patient Care Technician: She Barajas; Registered Nurse: Linda Burnett RN; Patient Care Assistant: Brandan Wills; Certified Nursing Assistant: Tamara Arora; Care Manager: Rajinder Fowler RN; Recreational Therapist: Suzan Li;  Recreational Therapist: Cynthia Watt; Registered Nurse: Carlos Lane RN; Physician Assistant: Soto Webb PA-C; Patient Care Technician: Nany Haji; Certified Nursing Assistant: Chato Martinez    Diagnosis: Principal Problem:    Depression  Active Problems:    Benzodiazepine abuse ([facility] [address] )    Stimulant use disorder      Patient Strengths/Assets: communication skills, family ties, negotiates basic needs    Patient Barriers/Limitations: marital/family conflict, poor insight, substance abuse    Short Term Goals: decrease in depressive symptoms, decrease in anxiety symptoms, decrease in suicidal thoughts, ability to stay safe on the unit, mood stabilization    Long Term Goals: improvement in depression, improvement in anxiety, stabilization of mood, free of suicidal thoughts, improved insight, acceptance of need for psychiatric medications, acceptance of need for psychiatric treatment, acceptance of need for psychiatric follow up after discharge    Progress Towards Goals: starting psychiatric medications as prescribed, starting groups, and adhere to her treatment plan in the unit      Recommended Treatment: medication management, patient medication education, group therapy, milieu therapy, continued Behavioral Health psychiatric evaluation/assessment process    Treatment Frequency: daily medication monitoring, group and milieu therapy daily, monitoring through interdisciplinary rounds, monitoring through weekly patient care conferences    Expected Discharge Date: 10 midnights    Discharge Plan: will be determined according to pt's progress in the unit     Treatment Plan Created/Updated By: Khalif Lee MD

## 2019-11-21 NOTE — ED NOTES
[address] [facility] [phone] is closed    Crisis to complete precert in the morning

## 2019-11-21 NOTE — PLAN OF CARE
Problem: SELF HARM/SUICIDALITY  Goal: Will have no self-injury during hospital stay  Description  INTERVENTIONS:  - Q 15 MINUTES: Routine safety checks  - Q WAKING SHIFT & PRN: Assess risk to determine if routine checks are adequate to maintain patient safety  - Encourage patient to participate actively in care by formulating a plan to combat response to suicidal ideation, identify supports and resources  Outcome: Progressing     Problem: DEPRESSION  Goal: Will be euthymic at discharge  Description  INTERVENTIONS:  - Administer medication as ordered  - Provide emotional support via 1:1 interaction with staff  - Encourage involvement in milieu/groups/activities  - Monitor for social isolation  Outcome: Progressing     Problem: SUBSTANCE USE/ABUSE  Goal: Will have no detox symptoms and will verbalize plan for changing substance-related behavior  Description  INTERVENTIONS:  - Monitor physical status and assess for symptoms of withdrawal  - Administer medication as ordered  - Provide emotional support with 1 on 1 interaction with staff  - Encourage recovery focused program/ addiction education  - Assess for verbalization of changing behaviors related to substance abuse  - Initiate consults and referrals as appropriate (Case Management, Spiritual Care, etc )  Outcome: Progressing  Goal: By discharge, will develop insight into their chemical dependency and sustain motivation to continue in recovery  Description  INTERVENTIONS:  - Attends all daily group sessions and scheduled AA groups  - Actively practices coping skills through participation in the therapeutic community and adherence to program rules  - Reviews and completes assignments from individual treatment plan  - Assist patient development of understanding of their personal cycle of addiction and relapse triggers  Outcome: Progressing  Goal: By discharge, patient will have ongoing treatment plan addressing chemical dependency  Description  INTERVENTIONS:  - Assist patient with resources and/or appointments for ongoing recovery based living  Outcome: Progressing     Problem: Ineffective Coping  Goal: Participates in unit activities  Description  Interventions:  - Provide therapeutic environment   - Provide required programming   - Redirect inappropriate behaviors   Outcome: Progressing
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
## 2019-11-21 NOTE — ED NOTES
EVS (Eligibility Verification System) called - [phone]    Automated system indicates: not on file

## 2019-11-21 NOTE — PLAN OF CARE
Problem: SELF HARM/SUICIDALITY  Goal: Will have no self-injury during hospital stay  Description  INTERVENTIONS:  - Q 15 MINUTES: Routine safety checks  - Q WAKING SHIFT & PRN: Assess risk to determine if routine checks are adequate to maintain patient safety  - Encourage patient to participate actively in care by formulating a plan to combat response to suicidal ideation, identify supports and resources  Outcome: Progressing     Problem: DEPRESSION  Goal: Will be euthymic at discharge  Description  INTERVENTIONS:  - Administer medication as ordered  - Provide emotional support via 1:1 interaction with staff  - Encourage involvement in milieu/groups/activities  - Monitor for social isolation  Outcome: Progressing     Problem: SUBSTANCE USE/ABUSE  Goal: Will have no detox symptoms and will verbalize plan for changing substance-related behavior  Description  INTERVENTIONS:  - Monitor physical status and assess for symptoms of withdrawal  - Administer medication as ordered  - Provide emotional support with 1 on 1 interaction with staff  - Encourage recovery focused program/ addiction education  - Assess for verbalization of changing behaviors related to substance abuse  - Initiate consults and referrals as appropriate (Case Management, Spiritual Care, etc )  Outcome: Progressing  Goal: By discharge, will develop insight into their chemical dependency and sustain motivation to continue in recovery  Description  INTERVENTIONS:  - Attends all daily group sessions and scheduled AA groups  - Actively practices coping skills through participation in the therapeutic community and adherence to program rules  - Reviews and completes assignments from individual treatment plan  - Assist patient development of understanding of their personal cycle of addiction and relapse triggers  Outcome: Progressing  Goal: By discharge, patient will have ongoing treatment plan addressing chemical dependency  Description  INTERVENTIONS:  - Assist patient with resources and/or appointments for ongoing recovery based living  Outcome: Progressing     Care plan initiated, monitoring is ongoing

## 2019-11-21 NOTE — PROGRESS NOTES
Patient refused 1600 Neurontin due to feeling too tired from her medication  Withdrawn to room, sleeping  Remains on 7" checks for safety and behaviors
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
## 2019-11-22 LAB
25(OH)D3 SERPL-MCNC: 37.4 NG/ML (ref 30–100)
VIT B12 SERPL-MCNC: 1681 PG/ML (ref 100–900)

## 2019-11-22 PROCEDURE — 82306 VITAMIN D 25 HYDROXY: CPT | Performed by: PHYSICIAN ASSISTANT

## 2019-11-22 PROCEDURE — 82607 VITAMIN B-12: CPT | Performed by: PHYSICIAN ASSISTANT

## 2019-11-22 PROCEDURE — 99232 SBSQ HOSP IP/OBS MODERATE 35: CPT | Performed by: PSYCHIATRY & NEUROLOGY

## 2019-11-22 RX ORDER — GABAPENTIN 100 MG/1
100 CAPSULE ORAL 2 TIMES DAILY
Status: DISCONTINUED | OUTPATIENT
Start: 2019-11-22 | End: 2019-11-25

## 2019-11-22 RX ORDER — GABAPENTIN 300 MG/1
300 CAPSULE ORAL
Status: DISCONTINUED | OUTPATIENT
Start: 2019-11-22 | End: 2019-11-25

## 2019-11-22 RX ADMIN — ESCITALOPRAM OXALATE 10 MG: 10 TABLET ORAL at 08:46

## 2019-11-22 RX ADMIN — HYDROXYZINE HYDROCHLORIDE 50 MG: 25 TABLET ORAL at 15:05

## 2019-11-22 RX ADMIN — TRAZODONE HYDROCHLORIDE 50 MG: 50 TABLET ORAL at 21:57

## 2019-11-22 RX ADMIN — IBUPROFEN 600 MG: 600 TABLET, FILM COATED ORAL at 21:58

## 2019-11-22 RX ADMIN — GABAPENTIN 100 MG: 100 CAPSULE ORAL at 17:01

## 2019-11-22 RX ADMIN — GABAPENTIN 300 MG: 300 CAPSULE ORAL at 21:56

## 2019-11-22 RX ADMIN — LEVOTHYROXINE SODIUM 125 MCG: 100 TABLET ORAL at 05:51

## 2019-11-22 RX ADMIN — LISINOPRIL 20 MG: 20 TABLET ORAL at 21:57

## 2019-11-22 RX ADMIN — GABAPENTIN 100 MG: 100 CAPSULE ORAL at 08:46

## 2019-11-22 RX ADMIN — HYDROXYZINE HYDROCHLORIDE 50 MG: 25 TABLET ORAL at 21:57

## 2019-11-22 NOTE — PROGRESS NOTES
Patient appears to have slept through the overnight hours without issue  No complaints voiced  No additional complaints of anxiety since PRN Atarax was administered at 2129 last night  No acute behaviors exhibited  Q7 minute safety checks in progress

## 2019-11-22 NOTE — PROGRESS NOTES
Patient out for meals and groups  Compliant with medications and meals  Pleasant and cooperative  Patient denies any SI/HI  Stated her depression and anxiety "are better" reported good sleep  Quiet keeps to herself  No other concerns or problems reported at this time  Q 7 mins checks in place for safety  Will continue to monitor for behaviors and changes

## 2019-11-22 NOTE — PLAN OF CARE
Problem: Ineffective Coping  Goal: Participates in unit activities  Description  Interventions:  - Provide therapeutic environment   - Provide required programming   - Redirect inappropriate behaviors   Outcome: Progressing  Patient met with writer and completed [facility] Admission Self Assessment  Patient stated her biggest stressors that lead to her hospitalization were "being alone, using, thoughts of taking my life, grief  Loss of daughter and her  and court cases " She believes the above did not affect her ability to do things that were important to her and claims she continued to go to work and take care of her personal responsibilities  What NS likes most about her life is "my grandson " What she likes least is " being caught up in this meth thing " Patient did complete [employer] and is employed at [employer] AND [employer] ( [employer])  Patient enjoys photography, hiking, music, reading, arts/crafts and exercise  She believes she would benefit from working on anger management, coping with symptoms, helping her family to understand her illness, developing healthy relationships and obtaining community supports/resources  Patient believes she will be ready for discharge when " want to feel better  Leave here with resources to help me with grief and drug addiction " Patient tearful at times however, calm, cooperative, insightful and displayed good eye contact  Patient signed document and copy will be supplied

## 2019-11-22 NOTE — SOCIAL WORK
CM Met with PT and PT signed bladimir for  office to fax letter for continuation and bladimir for D&A therapy as agreed upon       CM faxed letter to  Patricia Alvarez as requested by PT

## 2019-11-22 NOTE — PROGRESS NOTES
On assessment, patient was observed to be lying in her bed  She is pleasant with this writer during assessment  Patient denies pain  She rates her anxiety and depression both 7/10, denies SI, HI and hallucinations  She informs that she refused signing her tx plan today and also that she signed a 72 hr notice informing that she needs to go home  She began to cry, stating "I need to get home to my family  This isn't the place for me "  Reassurances provided to patient  She did attend both group and snack times in the evening  Patient was medication compliant at   She received one PRN dose of Atarax at 2129 for mild anxiety  Rouse Rating performed with a score of 11  Q7 minute safety checks in progress

## 2019-11-22 NOTE — PROGRESS NOTES
11/22/19 0948   Team Meeting   Meeting Type Daily Rounds   Team Members Present   Team Members Present Nurse;Physician;;; Occupational Therapist   Physician Team Member Dr Jinny Saunders MD; MT Elizondo   Nursing Team Member Bernard Diana, RN   Care Management Team Member Caprice Jeter, MS, [facility]   Social Work Team Member Kristin Geller, RN   Patient/Family Present   Patient Present No   Patient's Family Present No     PT slept last night  PT had PRN atarax at HS-effective, refused Neurontin 1st dose but then took it at [facility], refused to sign TX plan  PT agreeable to outpatient therapy  PT signed 72 hour notice at noon  Exploring potential 302, will review with PT today  Potential D/C Wednesday if progress

## 2019-11-22 NOTE — PLAN OF CARE
Problem: Ineffective Coping  Goal: Participates in unit activities  Description  Interventions:  - Provide therapeutic environment   - Provide required programming   - Redirect inappropriate behaviors   Outcome: Progressing   Patient joining groups today; she is open to sharing and participation

## 2019-11-22 NOTE — SOCIAL WORK
CM placed call to PT daughter Darline Lung and updated her on PT status and that PT is in agreement to staying until Wednesday  Reviewed PT TX and D/C planning with follow up Care  PT daughter in agreement  PT daughter requested for letter to be faxed to  office requesting tramaine CUNHA agreed  Call ended mutually  [phone]

## 2019-11-22 NOTE — PROGRESS NOTES
Patient requested atarax PRN for 8 out of 10 anxiety  Patient stated she was anxious and "shaky" patient out int he community  Compliant with 1700 medications and meals  No other concerns reported at this time  Q 7 mins checks in place for safety  Will continue to monitor for behaviors and changes

## 2019-11-22 NOTE — PROGRESS NOTES
Progress Note - Behavioral Health     Alfonso Holt 47 y o  female MRN: [id number]   Unit/Bed#: [facility] 205-01 Encounter: [id number]    Behavior over the last 24 hours: minimal improvement  Lisandro Galarza was seen today, she appears anxious and restless and still very focus in leaving the hospital  Staff spoke with her and she agrees to work with the team  Pt remains labile with limited insight but was able to listen and redirected  She has been compliant with meds in the unit  Staff report limited participation in groups and on the unit  Sleep: slept off and on  Appetite: fair  Medication side effects: denies  ROS: no complaints, all other systems are negative    Mental Status Evaluation:    Appearance:  casually dressed   Behavior:  slightly more redirectable   Speech:  normal rate and volume   Mood:  anxious   Affect:  labile   Thought Process:  goal directed   Associations: intact associations   Thought Content:  negative thoughts   Perceptual Disturbances: none   Risk Potential: Suicidal ideation - Yes, without plan  Homicidal ideation - None   Sensorium:  oriented to person, place and time/date   Memory:  recent and remote memory grossly intact   Consciousness:  alert and awake   Attention: attention span and concentration are age appropriate   Insight:  limited   Judgment: limited   Gait/Station: normal gait/station   Motor Activity: no abnormal movements     Vital signs in last 24 hours:    Temp:  [97 9 °F (36 6 °C)-98 6 °F (37 °C)] 98 6 °F (37 °C)  HR:  [84-87] 85  Resp:  [8-20] 18  BP: (121-126)/(67-75) 126/75    Laboratory results: I have personally reviewed all pertinent laboratory/tests results  Assessment/Plan   Principal Problem:    Depression  Active Problems:    Benzodiazepine abuse ([facility] [address] )    Stimulant use disorder    Recommended Treatment:     Planned medication and treatment changes:     All current active medications have been reviewed  Encourage group therapy, milieu therapy and occupational therapy  Behavioral Health checks every 7 minutes   Will increase Neurontin to 300 mg HS     Current Facility-Administered Medications:  acetaminophen 650 mg Oral Q6H PRN Julio Thomas MD   aluminum-magnesium hydroxide-simethicone 30 mL Oral Q4H PRN Julio Thomas MD   benztropine 1 mg Intramuscular Q6H PRN Julio Thomas MD   benztropine 1 mg Oral Q6H PRN Julio Thomas MD   escitalopram 10 mg Oral Daily Lisa Lazaro MD   gabapentin 100 mg Oral TID Lisa Lazaro MD   haloperidol 5 mg Oral Q6H PRN Julio Thomas MD   haloperidol lactate 5 mg Intramuscular Q6H PRN Julio Thomas MD   hydrOXYzine HCL 50 mg Oral Q6H PRN Lisa Lazaro MD   ibuprofen 600 mg Oral Q8H PRN Julio Thomas MD   ibuprofen 800 mg Oral Q8H PRN Julio Thomas MD   levothyroxine 125 mcg Oral Early Morning Santi Christy PA-C   lisinopril 20 mg Oral HS Yossi Mak MD   LORazepam 0 5 mg Oral Q8H PRN Lisa Lazaro MD   magnesium hydroxide 30 mL Oral Daily PRN Julio Thomas MD   nicotine polacrilex 2 mg Oral Q2H PRN CHRIS Almonte-TEO   risperiDONE 1 mg Oral Q6H PRN Julio Thomas MD   traZODone 50 mg Oral HS PRN Julio Thomas MD       Risks / Benefits of Treatment:    Risks, benefits, and possible side effects of medications explained to patient and patient verbalizes understanding and agreement for treatment  Counseling / Coordination of Care:    Patient's progress discussed with staff in treatment team meeting  Medications, treatment progress and treatment plan reviewed with patient      Mindy Mccauley MD 11/22/19

## 2019-11-22 NOTE — SOCIAL WORK
GUERLINE placed called to Jesusita  PT daughter  for family check in  PT daughter indicated that PT needs counseling as grieving continues and she has not seeked help in the past  PT daughter indicated that Drug use involved PT daughter reported PT told her that last time used was 2 days ago  According to daughter PT behavior has changed meaning, PT is more distant, at baseline the family is very close  PT daughter reported that she feels PT is scared  PT confessed to using to her her daughter when her daughter confronted her, and then 2 days later PT said she wanted to go get help  11/20/19 PT was admitted, day before PT was behavioral, "miserable, not acting herself," per PT daughter  PT did not mention anything about suicide, but was eratic, Eratic look likes yelling and flipping out  PT daughter stated she grew up with dad being being drug addict and was aware of the signs and this is why she confronted her mother  PT daughter indicated that PT kept it togetherness after PT daughter passes and then broke when dad passed  PT daughter indicated she is taking care of 13 y/o and the house and pets that they are not in need of anything they are ok  CM indicated that she would follow up with PT daughter once conversation was held with PT on status of stay and D/C  CM informed PT daughter of involuntary verses involuntary and a potential D/C for Wednesday if PT progresses  Reviewed follow up care for PT  PT daughter reflected she understood  Call ended mutually

## 2019-11-22 NOTE — PROGRESS NOTES
Ashlie#  EOR:[id number] F  DZM:[id number]    SLR:[id number]  Adm Date: 11/20/2019 1627  4:27 PM   ATT PHY: [address]         Subjective     The patient was seen after reviewing the chart and discussing the case with caring staff  Today during our encounter, the patient reported no acute concerns  Vitamin D and B12 levels are pending  Patient signed a 72 hour notice yesterday  Objective     Vitals:    11/22/19 0715   BP: 126/75   Pulse: 85   Resp: 18   Temp: 98 6 °F (37 °C)   SpO2: 95%       General Appearance: Awake and Alert  No acute distress  HEENT: Normocephalic, atraumatic  PERRLA, EOMI, MMM  Heart: RRR, no murmurs  Normal S1 and S2   Lungs: CTA bilaterally with fair air entry  Assessment     Linda Cruz is a(n) 47y o  year old female with depression      1  Cardiac with history of Hypertension  Lisinopril 20mg once daily  2  Hypothyroidism  Levothyroxine 125mcg once daily  TSH in 4 weeks  3  Tobacco Use  Nicotine gum PRN  4  Methamphetamine Use  Defer to psychiatry  The patient was discussed with Dr Brendon Montero and he is in agreement with the above note

## 2019-11-23 PROCEDURE — 99232 SBSQ HOSP IP/OBS MODERATE 35: CPT | Performed by: PSYCHIATRY & NEUROLOGY

## 2019-11-23 RX ORDER — AMOXICILLIN 250 MG
1 CAPSULE ORAL
Status: DISCONTINUED | OUTPATIENT
Start: 2019-11-23 | End: 2019-11-27 | Stop reason: HOSPADM

## 2019-11-23 RX ORDER — MELATONIN
1000 DAILY
Status: DISCONTINUED | OUTPATIENT
Start: 2019-11-23 | End: 2019-11-27 | Stop reason: HOSPADM

## 2019-11-23 RX ADMIN — VITAMIN D, TAB 1000IU (100/BT) 1000 UNITS: 25 TAB at 12:44

## 2019-11-23 RX ADMIN — ESCITALOPRAM OXALATE 10 MG: 10 TABLET ORAL at 09:06

## 2019-11-23 RX ADMIN — GABAPENTIN 100 MG: 100 CAPSULE ORAL at 16:39

## 2019-11-23 RX ADMIN — TRAZODONE HYDROCHLORIDE 50 MG: 50 TABLET ORAL at 21:18

## 2019-11-23 RX ADMIN — GABAPENTIN 300 MG: 300 CAPSULE ORAL at 21:16

## 2019-11-23 RX ADMIN — IBUPROFEN 600 MG: 600 TABLET, FILM COATED ORAL at 16:39

## 2019-11-23 RX ADMIN — LISINOPRIL 20 MG: 20 TABLET ORAL at 21:23

## 2019-11-23 RX ADMIN — GABAPENTIN 100 MG: 100 CAPSULE ORAL at 09:06

## 2019-11-23 RX ADMIN — HYDROXYZINE HYDROCHLORIDE 50 MG: 25 TABLET ORAL at 16:39

## 2019-11-23 RX ADMIN — SENNOSIDES AND DOCUSATE SODIUM 1 TABLET: 8.6; 5 TABLET ORAL at 12:44

## 2019-11-23 RX ADMIN — LEVOTHYROXINE SODIUM 125 MCG: 100 TABLET ORAL at 06:26

## 2019-11-23 RX ADMIN — SENNOSIDES AND DOCUSATE SODIUM 1 TABLET: 8.6; 5 TABLET ORAL at 21:16

## 2019-11-23 RX ADMIN — HYDROXYZINE HYDROCHLORIDE 50 MG: 25 TABLET ORAL at 09:24

## 2019-11-23 NOTE — PROGRESS NOTES
Progress Note - Behavioral Health   Jessica Spine 47 y o  female MRN: [id number]  Unit/Bed#: [facility] 205-01 Encounter: [id number]    Assessment/Plan   Principal Problem:    Depression  Active Problems:    Benzodiazepine abuse ([address] )    Stimulant use disorder      Behavior over the last 24 hours:  improved  Sleep: normal  Appetite: normal  Medication side effects: No  ROS: no complaints    Mental Status Evaluation:  Appearance:  casually dressed   Behavior:  psychomotor retardation   Speech:  soft   Mood:  depressed   Affect:  mood-congruent   Thought Process:  goal directed   Thought Content:  obsessions   Perceptual Disturbances: Pt denies   Risk Potential: Pt denies   Sensorium:  person, place, time/date, situation, day of week, month of year and year   Cognition:  grossly intact   Consciousness:  awake    Attention: attention span and concentration were age appropriate   Insight:  fair   Judgment: fair   Gait/Station: normal gait/station   Motor Activity: no abnormal movements     Progress Toward Goals: Pt reports an improved mood states that she feels more positive and is keen on discharge  Pt is engaged in the unit milue which she finds helpful,she also denies sleep or appetite issues and is medication compliant  Pt intends to address her issues with substance abuse following discharge    Recommended Treatment:   1-Cont current treatment  2-Continue with group therapy, milieu therapy and occupational therapy  Risks, benefits and possible side effects of Medications:   Risks, benefits, and possible side effects of medications explained to patient and patient verbalizes understanding        Medications:   current meds:   Current Facility-Administered Medications   Medication Dose Route Frequency    acetaminophen (TYLENOL) tablet 650 mg  650 mg Oral Q6H PRN    aluminum-magnesium hydroxide-simethicone (MYLANTA) 200-200-20 mg/5 mL oral suspension 30 mL  30 mL Oral Q4H PRN    benztropine (COGENTIN) injection 1 mg  1 mg Intramuscular Q6H PRN    benztropine (COGENTIN) tablet 1 mg  1 mg Oral Q6H PRN    cholecalciferol (VITAMIN D3) tablet 1,000 Units  1,000 Units Oral Daily    escitalopram (LEXAPRO) tablet 10 mg  10 mg Oral Daily    gabapentin (NEURONTIN) capsule 100 mg  100 mg Oral BID    gabapentin (NEURONTIN) capsule 300 mg  300 mg Oral HS    haloperidol (HALDOL) tablet 5 mg  5 mg Oral Q6H PRN    haloperidol lactate (HALDOL) injection 5 mg  5 mg Intramuscular Q6H PRN    hydrOXYzine HCL (ATARAX) tablet 50 mg  50 mg Oral Q6H PRN    ibuprofen (MOTRIN) tablet 600 mg  600 mg Oral Q8H PRN    ibuprofen (MOTRIN) tablet 800 mg  800 mg Oral Q8H PRN    levothyroxine tablet 125 mcg  125 mcg Oral Early Morning    lisinopril (ZESTRIL) tablet 20 mg  20 mg Oral HS    LORazepam (ATIVAN) tablet 0 5 mg  0 5 mg Oral Q8H PRN    magnesium hydroxide (MILK OF MAGNESIA) 400 mg/5 mL oral suspension 30 mL  30 mL Oral Daily PRN    nicotine polacrilex (NICORETTE) gum 2 mg  2 mg Oral Q2H PRN    risperiDONE (RisperDAL M-TABS) dispersible tablet 1 mg  1 mg Oral Q6H PRN    traZODone (DESYREL) tablet 50 mg  50 mg Oral HS PRN     Labs: I have personally reviewed pt's labs    Counseling / Coordination of Care  Total floor / unit time spent today 25 minutes  Greater than 50% of total time was spent with the patient and / or family counseling and / or coordination of care   A description of the counseling / coordination of care:

## 2019-11-23 NOTE — PROGRESS NOTES
Patient compliant with medications and meals  Pleasant and cooperative  Denies any SI/HI  Patient denies any depression, stated she does have anxiety  Rated her anxiety 7 out of 10 and requested atarax PRN with her morning medications  Social with peers  No behaviors noted this shift  Q 7 mins checks in place for safety  Will continue to monitor for behaviors and changes

## 2019-11-23 NOTE — PROGRESS NOTES
Patients family brought in the following:    3 black tops  2 pr   Of shorts multi color  2 white bras  1 pink comb

## 2019-11-23 NOTE — PROGRESS NOTES
Ashlie#  ECS:[id number] F  [id number]    [id number]  Adm Date: 11/20/2019 1627  4:27 PM   ATT PHY: [facility]     The patient was seen after reviewing the chart and discussing the case with caring staff  Today during our encounter, the patient reports that she feels constipated  She she claims that no bowel movements since her admission  Patient's vitamin-D levels were found to be low 37 4  Patient's vitamin B12 levels were within normal limits  Patient signed a 72 hour notice yesterday  Objective     Vitals:    11/23/19 0749   BP: 100/59   Pulse: 77   Resp: 18   Temp: 98 1 °F (36 7 °C)   SpO2: 96%       General Appearance: Awake and Alert  No acute distress  HEENT: Normocephalic, atraumatic  PERRLA, EOMI, MMM  Heart: RRR, no murmurs  Normal S1 and S2   Lungs: CTA bilaterally with fair air entry  Assessment     Glen Chinchilla is a(n) 47y o  year old female with depression      1  Cardiac with history of Hypertension  Lisinopril 20mg once daily  2  Hypothyroidism  Levothyroxine 125mcg once daily  TSH in 4 weeks  3  Tobacco Use  Nicotine gum PRN  4  Methamphetamine Use  Defer to psychiatry  5  Constipation  I will put the patient on Senokot S   6  New vitamin-D deficiency  I will put the patient on vitamin-D supplements

## 2019-11-23 NOTE — PROGRESS NOTES
Patient out in community  States she remains depressed  Anxiety has been controlled with PRN Atarax (see MARS please)  Maintained on Q 7 minute safety checks  No acting out, suicidal ideations, and/or homicidal behaviors  No changes in medical condition  Fluids maintained at bedside to promote hydration

## 2019-11-24 PROCEDURE — 99232 SBSQ HOSP IP/OBS MODERATE 35: CPT | Performed by: PSYCHIATRY & NEUROLOGY

## 2019-11-24 RX ADMIN — GABAPENTIN 100 MG: 100 CAPSULE ORAL at 16:37

## 2019-11-24 RX ADMIN — TRAZODONE HYDROCHLORIDE 50 MG: 50 TABLET ORAL at 21:37

## 2019-11-24 RX ADMIN — HYDROXYZINE HYDROCHLORIDE 50 MG: 25 TABLET ORAL at 08:35

## 2019-11-24 RX ADMIN — GABAPENTIN 300 MG: 300 CAPSULE ORAL at 21:04

## 2019-11-24 RX ADMIN — GABAPENTIN 100 MG: 100 CAPSULE ORAL at 08:35

## 2019-11-24 RX ADMIN — VITAMIN D, TAB 1000IU (100/BT) 1000 UNITS: 25 TAB at 08:35

## 2019-11-24 RX ADMIN — LORAZEPAM 0.5 MG: 0.5 TABLET ORAL at 22:35

## 2019-11-24 RX ADMIN — LORAZEPAM 0.5 MG: 0.5 TABLET ORAL at 14:31

## 2019-11-24 RX ADMIN — SENNOSIDES AND DOCUSATE SODIUM 1 TABLET: 8.6; 5 TABLET ORAL at 21:04

## 2019-11-24 RX ADMIN — LISINOPRIL 20 MG: 20 TABLET ORAL at 21:04

## 2019-11-24 RX ADMIN — MAGNESIUM HYDROXIDE 30 ML: 400 SUSPENSION ORAL at 12:19

## 2019-11-24 RX ADMIN — ESCITALOPRAM OXALATE 10 MG: 10 TABLET ORAL at 08:35

## 2019-11-24 RX ADMIN — LEVOTHYROXINE SODIUM 125 MCG: 100 TABLET ORAL at 06:34

## 2019-11-24 NOTE — PROGRESS NOTES
Patient requested PRN ativan for increased anxiety around 1430, rated her anxiety 8 out of 10  Sis Guess was 18  Medication given as ordered  Patient reports medication was effective  Patient compliant with 1600 medications and is currently eating dinner  Q 7 mins checks in place for safety  Will continue to monitor for behaviors and changes

## 2019-11-24 NOTE — PLAN OF CARE
Problem: SELF HARM/SUICIDALITY  Goal: Will have no self-injury during hospital stay  Description  INTERVENTIONS:  - Q 15 MINUTES: Routine safety checks  - Q WAKING SHIFT & PRN: Assess risk to determine if routine checks are adequate to maintain patient safety  - Encourage patient to participate actively in care by formulating a plan to combat response to suicidal ideation, identify supports and resources  Outcome: Progressing

## 2019-11-24 NOTE — PROGRESS NOTES
Patient has been mostly withdrawn to her room this evening, only came out for snack and to use the phone  Affect brightens upon approach  No SI/HI/Hallucinations  Requested prn trazodone for sleep  Will continue to observe

## 2019-11-24 NOTE — PROGRESS NOTES
Progress Note - Behavioral Health   Kary Delgado 47 y o  female MRN: [id number]  Unit/Bed#: [facility] 205-01 Encounter: [id number]    Assessment/Plan   Principal Problem:    Depression  Active Problems:    Benzodiazepine abuse ([address] )    Stimulant use disorder      Behavior over the last 24 hours:  improved  Sleep: normal  Appetite: normal  Medication side effects: No  ROS: no complaints    Mental Status Evaluation:  Appearance:  casually dressed seen lying in bed   Behavior:  psychomotor retardation   Speech:  soft   Mood:  depressed   Affect:  mood-congruent   Thought Process:  goal directed   Thought Content:  obsessions   Perceptual Disturbances: Pt denies   Risk Potential: Pt denies   Sensorium:  person, place, time/date, situation, day of week, month of year and year   Cognition:  grossly intact   Consciousness:  awake    Attention: attention span and concentration were age appropriate   Insight:  fair   Judgment: fair   Gait/Station: normal gait/station   Motor Activity: no abnormal movements     Progress Toward Goals: Pt seen this morning"I feel better"she was visited by her daughter yesterday and it went well  Pt cont to report an improved mood states that she feels more positive and remains keen on discharge  Pt is engaged in the unit milue which she finds helpful,she also cont to deny sleep or appetite issues and is medication compliant    Recommended Treatment:   1-Cont current treatment  2-Continue with group therapy, milieu therapy and occupational therapy  Risks, benefits and possible side effects of Medications:   Risks, benefits, and possible side effects of medications explained to patient and patient verbalizes understanding        Medications:   current meds:   Current Facility-Administered Medications   Medication Dose Route Frequency    acetaminophen (TYLENOL) tablet 650 mg  650 mg Oral Q6H PRN    aluminum-magnesium hydroxide-simethicone (MYLANTA) 200-200-20 mg/5 mL oral suspension 30 mL 30 mL Oral Q4H PRN    benztropine (COGENTIN) injection 1 mg  1 mg Intramuscular Q6H PRN    benztropine (COGENTIN) tablet 1 mg  1 mg Oral Q6H PRN    cholecalciferol (VITAMIN D3) tablet 1,000 Units  1,000 Units Oral Daily    escitalopram (LEXAPRO) tablet 10 mg  10 mg Oral Daily    gabapentin (NEURONTIN) capsule 100 mg  100 mg Oral BID    gabapentin (NEURONTIN) capsule 300 mg  300 mg Oral HS    haloperidol (HALDOL) tablet 5 mg  5 mg Oral Q6H PRN    haloperidol lactate (HALDOL) injection 5 mg  5 mg Intramuscular Q6H PRN    hydrOXYzine HCL (ATARAX) tablet 50 mg  50 mg Oral Q6H PRN    ibuprofen (MOTRIN) tablet 600 mg  600 mg Oral Q8H PRN    ibuprofen (MOTRIN) tablet 800 mg  800 mg Oral Q8H PRN    levothyroxine tablet 125 mcg  125 mcg Oral Early Morning    lisinopril (ZESTRIL) tablet 20 mg  20 mg Oral HS    LORazepam (ATIVAN) tablet 0 5 mg  0 5 mg Oral Q8H PRN    magnesium hydroxide (MILK OF MAGNESIA) 400 mg/5 mL oral suspension 30 mL  30 mL Oral Daily PRN    nicotine polacrilex (NICORETTE) gum 2 mg  2 mg Oral Q2H PRN    risperiDONE (RisperDAL M-TABS) dispersible tablet 1 mg  1 mg Oral Q6H PRN    senna-docusate sodium (SENOKOT S) 8 6-50 mg per tablet 1 tablet  1 tablet Oral HS    traZODone (DESYREL) tablet 50 mg  50 mg Oral HS PRN     Labs: I have personally reviewed pt's labs    Counseling / Coordination of Care  Total floor / unit time spent today 25 minutes  Greater than 50% of total time was spent with the patient and / or family counseling and / or coordination of care   A description of the counseling / coordination of care:

## 2019-11-24 NOTE — PROGRESS NOTES
Patient compliant with morning medications and meals  Rated her anxiety 6 out of 10 and requested atarax PRN with morning medications  Patient c/o of constipation and prune juice was given, patient was encouraged to drink water and walk around the unit  Patient denies any SI/HI  Stated she is missing her family and is hoping for d/c this week  No other concerns or problems reported  Will continue to monitor for behaviors and changes

## 2019-11-24 NOTE — PROGRESS NOTES
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

## 2019-11-24 NOTE — PROGRESS NOTES
Ashlie#  ZTO:[id number] F  [id number]    [id number]  Adm Date: 11/20/2019 1627  4:27 PM   ATT PHY: [address]         [facility]     The patient was seen after reviewing the chart and discussing the case with caring staff  Today during our encounter, the patient reports that she feels constipated  She she claims that no bowel movements since her admission  Patient's vitamin-D levels were found to be low 37 4  Patient's vitamin B12 levels were within normal limits  Patient signed a 72 hour notice yesterday 11/21/2019  Objective     Vitals:    11/24/19 1500   BP: 130/67   Pulse: 87   Resp: 18   Temp: 98 3 °F (36 8 °C)   SpO2: 96%       General Appearance: Awake and Alert  No acute distress  HEENT: Normocephalic, atraumatic  PERRLA, EOMI, MMM  Heart: RRR, no murmurs  Normal S1 and S2   Lungs: CTA bilaterally with fair air entry  Assessment     Shazia Barbour is a(n) 47y o  year old female with depression      1  Cardiac with history of Hypertension  Lisinopril 20mg once daily  2  Hypothyroidism  Levothyroxine 125mcg once daily  TSH in 4 weeks  3  Tobacco Use  Nicotine gum PRN  4  Methamphetamine Use  Defer to psychiatry  5  Constipation  I will put the patient on Senokot S   6  New vitamin-D deficiency  I will put the patient on vitamin-D supplements

## 2019-11-25 LAB — TSH SERPL DL<=0.05 MIU/L-ACNC: 0.03 UIU/ML (ref 0.45–5.33)

## 2019-11-25 PROCEDURE — 84443 ASSAY THYROID STIM HORMONE: CPT | Performed by: PSYCHIATRY & NEUROLOGY

## 2019-11-25 PROCEDURE — 99232 SBSQ HOSP IP/OBS MODERATE 35: CPT | Performed by: PSYCHIATRY & NEUROLOGY

## 2019-11-25 RX ORDER — LEVOTHYROXINE SODIUM 0.07 MG/1
150 TABLET ORAL
Status: DISCONTINUED | OUTPATIENT
Start: 2019-11-26 | End: 2019-11-26

## 2019-11-25 RX ORDER — GABAPENTIN 300 MG/1
300 CAPSULE ORAL 3 TIMES DAILY
Status: DISCONTINUED | OUTPATIENT
Start: 2019-11-25 | End: 2019-11-25

## 2019-11-25 RX ADMIN — IBUPROFEN 600 MG: 600 TABLET, FILM COATED ORAL at 18:19

## 2019-11-25 RX ADMIN — SENNOSIDES AND DOCUSATE SODIUM 1 TABLET: 8.6; 5 TABLET ORAL at 21:15

## 2019-11-25 RX ADMIN — LORAZEPAM 0.5 MG: 0.5 TABLET ORAL at 14:11

## 2019-11-25 RX ADMIN — LISINOPRIL 20 MG: 20 TABLET ORAL at 21:16

## 2019-11-25 RX ADMIN — GABAPENTIN 100 MG: 100 CAPSULE ORAL at 08:23

## 2019-11-25 RX ADMIN — TRAZODONE HYDROCHLORIDE 50 MG: 50 TABLET ORAL at 21:16

## 2019-11-25 RX ADMIN — LEVOTHYROXINE SODIUM 125 MCG: 100 TABLET ORAL at 05:42

## 2019-11-25 RX ADMIN — VITAMIN D, TAB 1000IU (100/BT) 1000 UNITS: 25 TAB at 08:23

## 2019-11-25 RX ADMIN — LORAZEPAM 0.5 MG: 0.5 TABLET ORAL at 22:19

## 2019-11-25 RX ADMIN — MAGNESIUM HYDROXIDE 30 ML: 400 SUSPENSION ORAL at 18:19

## 2019-11-25 RX ADMIN — ESCITALOPRAM OXALATE 10 MG: 10 TABLET ORAL at 08:23

## 2019-11-25 NOTE — PLAN OF CARE
Problem: Ineffective Coping  Goal: Participates in unit activities  Description  Interventions:  - Provide therapeutic environment   - Provide required programming   - Redirect inappropriate behaviors   Outcome: Progressing   Patient joins groups she is brighter and more engaged; she will share openly

## 2019-11-25 NOTE — PROGRESS NOTES
Ashlie#  URS:[id number] F  [id number]    WUD:[id number]  Adm Date: 11/20/2019 1627  4:27 PM   ATT PHY: [address]         Subjective     The patient was seen after reviewing the chart and discussing the case with caring staff  Patient examined at bedside, patient has no acute issues  Patient is a possible discharge for Wednesday 11/27/2019  Objective     Vitals:    11/25/19 0707   BP: 118/75   Pulse: 84   Resp: 16   Temp: 97 6 °F (36 4 °C)   SpO2: 95%       General Appearance: Awake and Alert  No acute distress  HEENT: Normocephalic, atraumatic  PERRLA, EOMI, MMM  Heart: RRR, no murmurs  Normal S1 and S2   Lungs: CTA bilaterally with fair air entry  Assessment     Solomon Burch is a(n) 47y o  year old female with depression      1  Cardiac with history of Hypertension  Lisinopril 20mg once daily  2  Hypothyroidism  Levothyroxine 125mcg once daily  TSH in 4 weeks  3  Tobacco Use  Nicotine gum PRN  4  Methamphetamine Use  Defer to psychiatry  5  Constipation  I will put the patient on Senokot S   6  New vitamin-D deficiency  I will put the patient on vitamin-D supplements

## 2019-11-25 NOTE — PROGRESS NOTES
The patient noted to be visible in room upon initial assessment, but noted to be visible in milieu upon successive rounds throughout shift  The patient noted to be calm and quiet at time of initial assessment, the patient rates depression 1/10, states " I don't have anxiety yet " RN provided education on medications being administered, patient verbalized The patient denies si, hi, and hallucinations  The patient noted to be compliant with meals and medications  The patient denies complaints of pain  Will continue to monitor

## 2019-11-25 NOTE — PROGRESS NOTES
Progress Note - Behavioral Health     Caressbaltazar Carpenter 47 y o  female MRN: [id number]   Unit/Bed#: [facility] 205-01 Encounter: [id number]    Behavior over the last 24 hours: slowly improving  Fay Mi reports doing better with no major worsening of anxiety or restlessness over the weekend  She says she has been compliant with medication in the unit and denies any current side effects  She has been using Ativan prn for her anxiety  Patient is still focus on her discharge on Wednesday  Staff report fair participation in groups and on the unit  Sleep: slept better  Appetite: fair  Medication side effects: denies  ROS: all other systems are negative, except chronic pain     Mental Status Evaluation:    Appearance:  age appropriate   Behavior:  less agitated   Speech:  normal rate and volume   Mood:  anxious   Affect:  labile   Thought Process:  goal directed   Associations: intact associations   Thought Content:  no overt delusions   Perceptual Disturbances: none   Risk Potential: Suicidal ideation - None  Homicidal ideation - None   Sensorium:  oriented to person, place and time/date   Memory:  recent and remote memory grossly intact   Consciousness:  alert and awake   Attention: attention span and concentration are age appropriate   Insight:  fair   Judgment: fair   Gait/Station: normal gait/station   Motor Activity: no abnormal movements     Vital signs in last 24 hours:    Temp:  [97 6 °F (36 4 °C)-98 9 °F (37 2 °C)] 98 9 °F (37 2 °C)  HR:  [79-92] 79  Resp:  [16-20] 18  BP: (108-122)/(65-75) 108/65    Laboratory results: I have personally reviewed all pertinent laboratory/tests results  Assessment/Plan   Principal Problem:    Depression  Active Problems:    Benzodiazepine abuse ([facility] [address] )    Stimulant use disorder    Recommended Treatment:     Planned medication and treatment changes:     All current active medications have been reviewed  Encourage group therapy, milieu therapy and occupational therapy  Behavioral Health checks every 7 minutes   Levothyroxine was increased to 150 mcg daily   D/C Neurontin due to minor hand and feet swelling     Current Facility-Administered Medications:  acetaminophen 650 mg Oral Q6H PRN Rafita Setting, MD   aluminum-magnesium hydroxide-simethicone 30 mL Oral Q4H PRN Rafita Setting, MD   benztropine 1 mg Intramuscular Q6H PRN Rafita Setting, MD   benztropine 1 mg Oral Q6H PRN Rafita Setting, MD   cholecalciferol 1,000 Units Oral Daily Tamanna Nair MD   escitalopram 10 mg Oral Daily Ramón Lal MD   haloperidol 5 mg Oral Q6H PRN Rafita Setting, MD   haloperidol lactate 5 mg Intramuscular Q6H PRN Rafita Setting, MD   hydrOXYzine HCL 50 mg Oral Q6H PRN Ramón Lal MD   ibuprofen 600 mg Oral Q8H PRN Rafita Setting, MD   ibuprofen 800 mg Oral Q8H PRN Rafita Setting, MD   [START ON 11/26/2019] levothyroxine 150 mcg Oral Early Morning Ramón Lal MD   lisinopril 20 mg Oral HS Tamanna Nair MD   LORazepam 0 5 mg Oral Q8H PRN Ramón Lal MD   magnesium hydroxide 30 mL Oral Daily PRN Rafita Setting, MD   nicotine polacrilex 2 mg Oral Q2H PRN Tali Christy PA-C   risperiDONE 1 mg Oral Q6H PRN Rafita Setting, MD   senna-docusate sodium 1 tablet Oral HS Tamanna Nair MD   traZODone 50 mg Oral HS PRN Rafita Setting, MD       Risks / Benefits of Treatment:    Risks, benefits, and possible side effects of medications explained to patient and patient verbalizes understanding and agreement for treatment  Counseling / Coordination of Care:    Patient's progress discussed with staff in treatment team meeting  Medications, treatment progress and treatment plan reviewed with patient      Eric Paiz MD 11/25/19

## 2019-11-25 NOTE — PROGRESS NOTES
Patient came to the nurse's station requesting as needed medication for anxiety, stating " I could use something, I'm a little anxious " The patient states that she was sitting in group coloring and became anxious  Rouse anxiety scale score 8  Rn offered as needed atarax, the patient declined stating "ativan works better for me " Rn administered as needed ativan as per physician's orders  The patient complained of progressive swelling to bilateral feet, hands and face since starting Neurontin  The patient states "it wasn't too bad at first, but now my feet are really swollen " Trace edema noted to bilateral hands, facial edema not noted by RN, but patient states face "feels tight," +1 non-pitting edema noted to bilateral feet and ankles  RN notified Dr Albert Finch of the patient's assessment  RN provided education to patient to keep lower extremities elevated when able, and limit excess fluid and sodium intake  The patient verbalized positive understanding of instructions provided  New orders noted  Will continue to monitor

## 2019-11-25 NOTE — PROGRESS NOTES
11/25/19 0956   Team Meeting   Meeting Type Daily Rounds   Team Members Present   Team Members Present Physician;Nurse;;; Occupational Therapist   Physician Team Member Dr Melba Sheehan MD, [address]    Nursing Team Member Amilcar Fregoso, MILLY    Care Management Team Member Ernie Kunz [facility], [facility]    Social Work Team Member Rola Blair [facility]    OT Team Member Robbi Chu [place]   Patient/Family Present   Patient Present No   Patient's Family Present No     Rescinded 67* notice; prns utilized last night; anx/dep - denies; no SI/HI; med compliant; slept

## 2019-11-25 NOTE — PROGRESS NOTES
The patient noted to be visible sitting in milieu sitting quietly at time of reassessment following administration of as needed ativan  The patient states anxiety "is better" at this time  Will continue to monitor

## 2019-11-25 NOTE — SOCIAL WORK
CM spoke with PT daughter Godwinaletha Mcfaddenelvis whom indicated the  office did not receive the letter of PT tanja Sullivan provided CM new fax number to send directly to the court house  CM placed return call to Tato Riojas indicating she received a confirmation page that it went through  Tato Riojas indicated she will follow up with   Call ended mutually

## 2019-11-25 NOTE — PROGRESS NOTES
[id number]  Patient visible out and about in the community  She is pleasant and cooperative  Denies depression, anxiety, and suicidal thoughts  States that she believes the Lexapro is helping her mood  Reported feeling constipated and requested PRN Miralax and an ibuprofen for generalized body aches  Will continue monitoring

## 2019-11-25 NOTE — SOCIAL WORK
CM met with PT for PT check in  PT indicated that she would like to have a family call with her daughter that CM spoke with PT daughter without her  CM reviewed that PT had given consent previously to talk with PT but would be respectful of not talking with PT daughter moving forward if those are PT wishes  PT indicated no that she wanted to talk about D/C home tomorrow  CM indicated that PT daughter had called earlier and CM reviewed with her the plan for D/C for Wednesday and reason why and also that PT daughter requested CM  To refax the paper work to the court house within the next 20 minutes as the  indicated she did not receive the faxed letter on Friday or they were going to dismiss the case  CM reassured PT that she faxed this am as requested to prevent this from happening  PT thanked CM  PT then went on to discuss that she feels ready to D/C because she needs to get back to work and that her work is going to fire her  CM inquired with PT that PT had told her that she has a good relationship with her boss and that her boss has been supportive of her  PT reflected that is accurate  PT then went on to say that they are going to fire her  Because she was on a mental health unit  CM indicated that PT is not obligated to tell her employer that is where she was  CM reassured PT that she would provide letter for employer as PT requested but would not specify unit, PT thanked GUERLINE  PT indicated that she spoke with weekend doctor and he told her she could D/C today  CM indicated that after speaking with doctor the plan for D/C is for Wednesday  PT became upset and pressured stating "I'm going to report you because you ding me into signing that paper"   GUERLINE apologized to PT that by no means was that her intention of making PT feel, that it was PT decision, and she agreed to stay with potential D/C on Wednesday, that CM intention was to inform PTof potential outcomes and reason for request to stay admitted and how it would benefit PT  PT then went on to state how she is just worried about work and that she is ok with staying  CM reassured PT and offered for PT to call her boss  PT agreed and called and left her boss a message  Afterwards, PT indicated that she feels better now that she called  CM encouraged PT to reach out to her if she gets a fax number that CM could fax letter to her boss if she chose, or could provide it at D/C for PT to hand directly  PT indicated she would let CM know  PT fluctuated in her mood from pleasant, to crying, to irritability throughout conversation  PT and CM then called PT daughter Claude Brunt together in which PT reported to PT daughter that she is going to stay until Wednesday and that PT daughter will pick PT up at 1030am  PT daughter in agreement  PT spoke with grandson on phone and this seems to elevate her mood  CM revisited with PT feeling like she was "ding" into rescinding 72 hour notice  PT indicated that's how she feels  CM apologized again as this was not her intent and reviewed that she was presenting to PT that the plan would have been to 302 PT as the team did not feel PT was ready to D/C home  PT reflected she understood but is frustrated because she feels no control  CM validated PT emotions in how this could feel that way and reflected on PT strengths and supports  PT reflected on speaking with her boyfriend that uses meth and that he is going to get help and this makes PT happy to hear  PT reflected on looking forward to being home with her family  PT requested that we escript her medications into [facility] in [place]  CM indicated she would notify provider  Reviewed plan for D/C on Thursday and follow up care to be scheduled PT in agreement

## 2019-11-25 NOTE — PROGRESS NOTES
PRN Ativan administered at 2235 appears to have been effective as patient was able to sleep through the night without any further c/o anxiety or restlessness  No acute behaviors exhibited during the overnight  Early AM lab work completed as per order  Q7 minute safety checks remain in place

## 2019-11-25 NOTE — QUICK NOTE
11/25/19 @1340    Pt  Exchanged black jeans for blue jeans (without holes)    Pt  Exchanged black long sleeve sweater for pink shirt with lace shoulders      *for additional PT belongings look for Maria Herring Notes 11/20/2019 @11:09pm

## 2019-11-25 NOTE — PROGRESS NOTES
Patient was present in the community this evening  She brightens on approach, informing she is feeling better and is ready to go home  At time of assessment, she denies anxiety and depression, denies SI, HI and hallucinations  She denies any pain  Patient informs milk of mag she received earlier today at 1219 was effective as she was able to have a BM  Later in the evening, patient requests PRN trazodone which was administered at 2138  Approximately 20 minutes later, patient ambulates to the nurses station to inform the medication is not working  This writer requested that she return to her room and allow the medication some time to take effect  At 2230 during round, this writer observed patient still awake  PRN Ativan was administered as per patient request for 'anxiety '  [facility] Scale rating performed with a result of 7  Will monitor for medication effectiveness  Q7 minute safety checks in progress

## 2019-11-25 NOTE — SOCIAL WORK
CM spoke with PT daughter Oswaldo Bailey  Reviewed PT status and progress made thus far  Laurie requested for PT to D/C earlier then Wednesday, GUERLINE reviewed with Laurie the reasoning for Wed  D/C, Laurie reflected she understood and is in agreement  Oswaldo Bailey will  PT on Wednesday at 1030am  Call ended mutually

## 2019-11-26 PROCEDURE — 99232 SBSQ HOSP IP/OBS MODERATE 35: CPT | Performed by: PSYCHIATRY & NEUROLOGY

## 2019-11-26 RX ORDER — LISINOPRIL 20 MG/1
20 TABLET ORAL
Qty: 30 TABLET | Refills: 1 | Status: SHIPPED | OUTPATIENT
Start: 2019-11-26

## 2019-11-26 RX ORDER — AMOXICILLIN 250 MG
1 CAPSULE ORAL
Qty: 30 TABLET | Refills: 0 | Status: SHIPPED | OUTPATIENT
Start: 2019-11-26

## 2019-11-26 RX ORDER — LEVOTHYROXINE SODIUM 0.12 MG/1
125 TABLET ORAL
Qty: 30 TABLET | Refills: 1 | Status: SHIPPED | OUTPATIENT
Start: 2019-11-27

## 2019-11-26 RX ORDER — ESCITALOPRAM OXALATE 10 MG/1
10 TABLET ORAL DAILY
Qty: 30 TABLET | Refills: 0 | Status: SHIPPED | OUTPATIENT
Start: 2019-11-27

## 2019-11-26 RX ORDER — ALPRAZOLAM 0.5 MG/1
0.5 TABLET ORAL 3 TIMES DAILY PRN
Refills: 0
Start: 2019-11-26 | End: 2021-02-15

## 2019-11-26 RX ORDER — MELATONIN
1000 DAILY
Qty: 30 TABLET | Refills: 1 | Status: SHIPPED | OUTPATIENT
Start: 2019-11-27

## 2019-11-26 RX ADMIN — LORAZEPAM 0.5 MG: 0.5 TABLET ORAL at 20:39

## 2019-11-26 RX ADMIN — TRAZODONE HYDROCHLORIDE 50 MG: 50 TABLET ORAL at 21:14

## 2019-11-26 RX ADMIN — ESCITALOPRAM OXALATE 10 MG: 10 TABLET ORAL at 08:40

## 2019-11-26 RX ADMIN — LISINOPRIL 20 MG: 20 TABLET ORAL at 21:13

## 2019-11-26 RX ADMIN — SENNOSIDES AND DOCUSATE SODIUM 1 TABLET: 8.6; 5 TABLET ORAL at 21:14

## 2019-11-26 RX ADMIN — VITAMIN D, TAB 1000IU (100/BT) 1000 UNITS: 25 TAB at 08:40

## 2019-11-26 RX ADMIN — HYDROXYZINE HYDROCHLORIDE 50 MG: 25 TABLET ORAL at 08:45

## 2019-11-26 RX ADMIN — LEVOTHYROXINE SODIUM 150 MCG: 75 TABLET ORAL at 06:12

## 2019-11-26 RX ADMIN — HYDROXYZINE HYDROCHLORIDE 50 MG: 25 TABLET ORAL at 22:27

## 2019-11-26 RX ADMIN — ACETAMINOPHEN 650 MG: 325 TABLET ORAL at 13:46

## 2019-11-26 RX ADMIN — LORAZEPAM 0.5 MG: 0.5 TABLET ORAL at 11:56

## 2019-11-26 NOTE — SOCIAL WORK
CM placed call to [facility]URG[facility] clinic   12/12/19 at 12pm scheduled for intake with Shelbi Lr for psych follow up

## 2019-11-26 NOTE — SOCIAL WORK
CM met with PT for PT check in  PT indicated that she is feeling better and reflected on her conversation with the Dr  About her anxiety medication and that the atarax made her tired this am but the ativan helps her  CM reassured PT and praised her communication with Dr about her needs  PT denies SI/HI/AH/VH, denies depression, and reports having an incident with anxiety this am when thinking about the heat in the home and having to get tanks filled when she gets back  PT indicated she is ok now but had to process this  PT feels she is ready for D/C tomorrow and is looking forward to getting home

## 2019-11-26 NOTE — DISCHARGE INSTR - APPOINTMENTS
The treatment team recommends ongoing medication management upon discharge  A referral has been made on your behalf to [facility]/SURGICAL hospitals clinic located at [facility] [id number], Phone: [phone]  Your intake appointment is scheduled for 12/12/2019 at 12:00pm   Please plan to arrive 15 minutes prior to your scheduled appointment and bring your insurance card(s), photo ID  Your discharge will be faxed to this provider for continuity of care  You tested positive for amph/meth at admission  You have indicated that you would be open to outpatient drug and alcohol treatment  A referral has been made on your behalf to [facility] [address] and [facility] located at [facility] [address], [address], Phone: [phone]; please call them directly to schedule intake appointment  The treatment team recommends follow up with your primary care physican Dr Kayleen Sanchez MD located at [address] [place] [address], Phone: [phone]  upon discharge  Your  appointment is scheduled for 12/2/2019 at 3:00pm   Please plan to arrive 15 minutes prior to your scheduled appointment and bring your insurance card(s), photo ID  Your discharge will be faxed to this provider for continuity of care

## 2019-11-26 NOTE — PROGRESS NOTES
Patient has been out in the milieu all evening  Bright, pleasant and social with peers and staff  Reports she is looking forward to discharge Wednesday and feels ready  Denies SI/HI, still has periods of increased anxiety  Requested prn ativan for anxiety at hs  Continues with +1 non pitting edema to both le's and feet  Encouraged to elevate in bed  Will continue to observe

## 2019-11-26 NOTE — PROGRESS NOTES
The patient requesting as needed dose of ativan for increased anxiety with a rating of 8/10  The patient states "the doctor said I can have the ativan " The patient states "the atarax completely knocked me out " [facility] anxiety scale score 13 at time of assessment  As needed ativan administered at 1156 as per physician's orders  The patient states edema to face, bilateral hands, and bilateral feet have improved  Trace non-pitting edema noted to bilateral feet improved from previous day assessment  Will continue to monitor

## 2019-11-26 NOTE — SOCIAL WORK
CM spoke with Dr Kasia Tracy office PT scheduled for follow up on 12/2/19 at 3pm with Dr Carmelo Gerber

## 2019-11-26 NOTE — PROGRESS NOTES
Patient resting quietly in bed, no noted distress  Patient stated she received "little" relief from PRN Atarax, support provided  Remains on 7" checks for safety and behaviors

## 2019-11-26 NOTE — PLAN OF CARE
PT to D/C home tomorrow, will follow up with psych/medication management and therapy with [facility] clinic-intake scheduled, referral made to [facility] D[facility] P to call direct to schedule intake, follow up with PCP scheduled

## 2019-11-26 NOTE — PROGRESS NOTES
11/26/19 1001   Team Meeting   Meeting Type Daily Rounds   Patient/Family Present   Patient Present No   Patient's Family Present No     Daily Psychiatric Rounds    Team Members Present:    Eliana Chamorro, MT Gonzales, MS,NCC,LPC  Valentine Berg MSW  Jim case, CTRS  Evelyn Danielle, RN  Gabby Juarez, RN    Discussion:     Utilizing PRN ativan at times  Tearful d/t anxiety r/t home stressors  Atarax PRN this am  Projected for discharge tomorrow with [facility] and D&A follow up   by daughter at 55

## 2019-11-26 NOTE — PROGRESS NOTES
The patient noted to be visible in the milieu upon initial assessment  The patient requested as needed medication for anxiety  The patient states anxiety is high due to "thinking about everything I need to do when I get home " [facility] anxiety scale score 11  RN offered the patient as needed atarax, the patient agreeable  Medication administered at 0845 as per physician's orders  Will continue to monitor

## 2019-11-26 NOTE — PROGRESS NOTES
Progress Note - Behavioral Health     Ruth Ann Barnett 47 y o  female MRN: [id number]   Unit/Bed#: [facility] 205-01 Encounter: [id number]    Behavior over the last 24 hours: improved  Ermelinda Anaya was seen today  She reports doing better and looking for for discharge tomorrow  Patient reports that Atarax p r n  was not helpful as Ativan p r n  Patient has been taking Xanax prescribed by her PCP before admission  Med education regarding Benzodiazepine was provided and patient understand the risk and benefit at this time  Patient denies any suicide ideation or wish to die consistently and agrees to follow up recommended aftercare plan by the team  Staff report improved participation in groups and on the unit  Sleep: improved  Appetite: normal  Medication side effects: denies  ROS: all other systems are negative, except chronic pain     Mental Status Evaluation:    Appearance:  age appropriate   Behavior:  cooperative   Speech:  normal rate and volume   Mood:  improved   Affect:  appropriate   Thought Process:  goal directed   Associations: intact associations   Thought Content:  no overt delusions   Perceptual Disturbances: none   Risk Potential: Suicidal ideation - None  Homicidal ideation - None   Sensorium:  oriented to person, place and time/date   Memory:  recent and remote memory grossly intact   Consciousness:  alert and awake   Attention: attention span and concentration are age appropriate   Insight:  improved   Judgment: improved   Gait/Station: normal gait/station   Motor Activity: no abnormal movements     Vital signs in last 24 hours:    Temp:  [98 5 °F (36 9 °C)-99 3 °F (37 4 °C)] 99 3 °F (37 4 °C)  HR:  [79-94] 94  Resp:  [18-19] 18  BP: (108-134)/(64-69) 129/69    Laboratory results: I have personally reviewed all pertinent laboratory/tests results  TSH is low and she will follow up as outpatient basis        Assessment/Plan   Principal Problem:    Depression  Active Problems:    Benzodiazepine abuse [facility])    Stimulant use disorder    Recommended Treatment:     Planned medication and treatment changes: All current active medications have been reviewed  Encourage group therapy, milieu therapy and occupational therapy  Behavioral Health checks every 7 minutes    Current Facility-Administered Medications:  acetaminophen 650 mg Oral Q6H PRN Katarzyna Pump, MD   aluminum-magnesium hydroxide-simethicone 30 mL Oral Q4H PRN Katarzyna Pump, MD   benztropine 1 mg Intramuscular Q6H PRN Katarzyna Pump, MD   benztropine 1 mg Oral Q6H PRN Katarzyna Pump, MD   cholecalciferol 1,000 Units Oral Daily Mike Palm MD   escitalopram 10 mg Oral Daily Marla Goodwin MD   haloperidol 5 mg Oral Q6H PRN Katarzyna Pump, MD   haloperidol lactate 5 mg Intramuscular Q6H PRN Katarzyna Pump, MD   hydrOXYzine HCL 50 mg Oral Q6H PRN Marla Goodwin, MD   ibuprofen 600 mg Oral Q8H PRN Katarzyna Pump, MD   ibuprofen 800 mg Oral Q8H PRN Katarzyna Pump, MD   [START ON 11/27/2019] levothyroxine 125 mcg Oral Early Morning Santi Christy PA-C   lisinopril 20 mg Oral HS Mike Palm MD   LORazepam 0 5 mg Oral Q8H PRN Marla Goodwin MD   magnesium hydroxide 30 mL Oral Daily PRN Katarzyna Pump, MD   nicotine polacrilex 2 mg Oral Q2H PRN Daphine Iza Christy PA-C   risperiDONE 1 mg Oral Q6H PRN Katarzyna Pump, MD   senna-docusate sodium 1 tablet Oral HS Mike Palm MD   traZODone 50 mg Oral HS PRN Katarzyna Pump, MD       Risks / Benefits of Treatment:    Risks, benefits, and possible side effects of medications explained to patient and patient verbalizes understanding and agreement for treatment  Counseling / Coordination of Care:    Patient's progress discussed with staff in treatment team meeting  Medications, treatment progress and treatment plan reviewed with patient     D/C was discussed for tomorrow    Khalif Lee MD 11/26/19

## 2019-11-26 NOTE — PROGRESS NOTES
The patient noted to be visible on the unit, socializing with peer and ambulating with peer on the unit  The patient denies complaints at this time  Will continue to monitor

## 2019-11-26 NOTE — DISCHARGE INSTRUCTIONS
Alprazolam (By mouth)   Alprazolam ([id number]-[id number])  Treats anxiety and panic disorder  Brand Name(s): ALPRAZolam Intensol, Niravam, Xanax, Xanax XR   There may be other brand names for this medicine  When This Medicine Should Not Be Used: This medicine is not right for everyone  Do not use it if you had an allergic reaction to alprazolam or similar medicines, or if you are pregnant or breastfeeding, or you have narrow-angle glaucoma  How to Use This Medicine:   Liquid, Tablet, Dissolving Tablet, Long Acting Tablet  · Take your medicine as directed  Your dose may need to be changed several times to find what works best for you  · Disintegrating tablet: Dry your hands before you handle the tablet  Place the tablet on your tongue and let it dissolve  · Extended-release tablet: Swallow the extended-release tablet whole  Do not crush, break, or chew it  · Oral liquid: Measure the oral liquid medicine with a marked measuring spoon, oral syringe, or medicine cup  · This medicine should come with a Medication Guide  Ask your pharmacist for a copy if you do not have one  · Missed dose: Take a dose as soon as you remember  If it is almost time for your next dose, wait until then and take a regular dose  Do not take extra medicine to make up for a missed dose  · Store the medicine in a closed container at room temperature, away from heat, moisture, and direct light  Protect the orally disintegrating tablets from moisture  Throw away any cotton that was in the bottle and reseal it tightly after each use  Drugs and Foods to Avoid:   Ask your doctor or pharmacist before using any other medicine, including over-the-counter medicines, vitamins, and herbal products  · Do not use this medicine if you are also using ketoconazole or itraconazole  · Some foods and medicines can affect how alprazolam works   Tell your doctor if you are using any of the following:  ¨ Amiodarone, carbamazepine, clarithromycin, cimetidine, cyclosporine, desipramine, diltiazem, ergotamine, erythromycin, fluconazole, fluoxetine, fluvoxamine, imipramine, isoniazid, nefazodone, nicardipine, nifedipine, paroxetine, propoxyphene, sertraline, or theophylline  ¨ Birth control pills  ¨ Seizure medicine  · Tell your doctor if you use anything else that makes you sleepy  Some examples are allergy medicine, narcotic pain medicine, and alcohol  · Do not drink alcohol while you are using this medicine  · Do not eat grapefruit or drink grapefruit juice while you are using this medicine  Warnings While Using This Medicine:   · It is not safe to take this medicine during pregnancy  It could harm an unborn baby  Tell your doctor right away if you become pregnant  · Tell your doctor if you have kidney disease, liver disease, glaucoma, lung problems, or a history of drug or alcohol abuse, depression, mental health problems, or seizures  · This medicine may cause the following problems:  ¨ Respiratory depression (serious breathing problem that can be life-threatening), when used with narcotic pain medicines  · This medicine can increase thoughts of suicide  Tell your doctor right away if you start to feel depressed and have thoughts about hurting yourself  · This medicine may make you dizzy or drowsy  Do not drive or do anything else that could be dangerous until you know how this medicine affects you  · This medicine can be habit-forming  Do not use more than your prescribed dose  Call your doctor if you think your medicine is not working  · Do not stop using this medicine suddenly  Your doctor will need to slowly decrease your dose before you stop it completely  · Your doctor will do lab tests at regular visits to check on the effects of this medicine  Keep all appointments  · Keep all medicine out of the reach of children  Never share your medicine with anyone    Possible Side Effects While Using This Medicine:   Call your doctor right away if you notice any of these side effects:  · Allergic reaction: Itching or hives, swelling in your face or hands, swelling or tingling in your mouth or throat, chest tightness, trouble breathing  · Blistering, peeling, red skin rash  · Blue lips, fingernails, or skin  · Confusion, lightheadedness, dizziness, problems with coordination or memory  · Extreme drowsiness or weakness, slow heartbeat, trouble breathing  · Seizure  If you notice these less serious side effects, talk with your doctor:   · Change in appetite or weight  · Constipation  If you notice other side effects that you think are caused by this medicine, tell your doctor  Call your doctor for medical advice about side effects  You may report side effects to FDA at 9-881-FDA-5848  © 2017 [address] Information is for End User's use only and may not be sold, redistributed or otherwise used for commercial purposes  The above information is an  only  It is not intended as medical advice for individual conditions or treatments  Talk to your doctor, nurse or pharmacist before following any medical regimen to see if it is safe and effective for you  Escitalopram (By mouth)   Escitalopram ([id number]-xis-PKL-oh-pram)  Treats depression and generalized anxiety disorder (MARCELLE)  Brand Name(s): Lexapro   There may be other brand names for this medicine  When This Medicine Should Not Be Used: This medicine is not right for everyone  Do not use it if you had an allergic reaction to escitalopram or citalopram   How to Use This Medicine:   Liquid, Tablet  · Take this medicine as directed  You may need to take it for a month or more before you feel better  Your dose may need to be changed to find out what works best for you  · Measure the oral liquid medicine with a marked measuring spoon, oral syringe, or medicine cup  · This medicine should come with a Medication Guide   Ask your pharmacist for a copy if you do not have one   · Missed dose: Take a dose as soon as you remember  If it is almost time for your next dose, wait until then and take a regular dose  Do not take extra medicine to make up for a missed dose  · Store the medicine in a closed container at room temperature, away from heat, moisture, and direct light  Drugs and Foods to Avoid:   Ask your doctor or pharmacist before using any other medicine, including over-the-counter medicines, vitamins, and herbal products  · Do not use this medicine together with pimozide  Do not use this medicine and an MAO inhibitor (MAOI) within 14 days of each other  · Some medicines can affect how escitalopram works  Tell your doctor if you are using the following:   ¨ Buspirone, carbamazepine, fentanyl, lithium, María Elena's wort, tramadol, or tryptophan supplements  ¨ Amphetamines  ¨ Blood thinner (including warfarin)  ¨ Diuretic (water pill)  ¨ NSAID pain or arthritis medicine (including aspirin, celecoxib, diclofenac, ibuprofen, naproxen)  ¨ Triptan medicine to treat migraine headaches (including sumatriptan)  · Tell your doctor if you use anything else that makes you sleepy  Some examples are allergy medicine, narcotic pain medicine, and alcohol  · Do not drink alcohol while you are using this medicine  Warnings While Using This Medicine:   · Tell your doctor if you are pregnant or breastfeeding, or if you have kidney disease, liver disease, bleeding problems, glaucoma, heart disease, or a seizure disorder  · For some children, teenagers, and young adults, this medicine may increase mental or emotional problems  This may lead to thoughts of suicide and violence  Talk with your doctor right away if you have any thoughts or behavior changes that concern you  Tell your doctor if you or anyone in your family has a history of bipolar disorder or suicide attempts    · This medicine may cause the following problems:   ¨ Serotonin syndrome (more likely when taken with certain medicines)  ¨ Low sodium levels  ¨ Increased risk of bleeding problems  · This medicine may make you dizzy or drowsy  Do not drive or do anything that could be dangerous until you know how this medicine affects you  · Your doctor may want to monitor your child's weight and height, because this medicine may cause decreased appetite and weight loss in children  · Do not stop using this medicine suddenly  Your doctor will need to slowly decrease your dose before you stop it completely  · Your doctor will check your progress and the effects of this medicine at regular visits  Keep all appointments  · Keep all medicine out of the reach of children  Never share your medicine with anyone  Possible Side Effects While Using This Medicine:   Call your doctor right away if you notice any of these side effects:  · Allergic reaction: Itching or hives, swelling in your face or hands, swelling or tingling in your mouth or throat, chest tightness, trouble breathing  · Anxiety, restlessness, fever, sweating, muscle spasms, nausea, vomiting, diarrhea, seeing or hearing things that are not there  · Confusion, weakness, and muscle twitching  · Eye pain, vision changes, seeing halos around lights  · Fast, pounding, or uneven heartbeat  · Feeling more excited or energetic than usual, racing thoughts, trouble sleeping  · Seizures  · Thoughts of hurting yourself or others, unusual behavior  · Unusual bleeding or bruising  If you notice these less serious side effects, talk with your doctor:   · Dizziness, drowsiness, or sleepiness  · Dry mouth  · Headache  · Nausea, constipation, diarrhea  · Sexual problems  If you notice other side effects that you think are caused by this medicine, tell your doctor  Call your doctor for medical advice about side effects   You may report side effects to FDA at 9-758-FDA-8317  © 2017 [address] Information is for End User's use only and may not be sold, redistributed or otherwise used for commercial purposes  The above information is an  only  It is not intended as medical advice for individual conditions or treatments  Talk to your doctor, nurse or pharmacist before following any medical regimen to see if it is safe and effective for you  Depression   WHAT YOU NEED TO KNOW:   What is depression? Depression is a medical condition that causes feelings of sadness or hopelessness that do not go away  Depression may cause you to lose interest in things you used to enjoy  These feelings may interfere with your daily life  What causes or increases my risk for depression? Depression may be caused by changes in brain chemicals that affect your mood  Your risk for depression may be higher if you have any of the following:  · Stressful events such as the death of a loved one, unemployment, childhood trauma, divorce, or domestic abuse    · A chronic medical condition such as diabetes, heart disease, or cancer    · Parents, siblings, or other family members with a history of depression    · Drug or alcohol abuse  What are the signs and symptoms of depression? · Appetite changes, or weight gain or loss    · Trouble going to sleep or staying asleep, or sleeping too much    · Fatigue or lack of energy    · Feeling restless, irritable, or withdrawn    · Feeling worthless, hopeless, discouraged, or guilty    · Trouble concentrating, remembering things, doing daily tasks, or making decisions    · Thoughts about hurting or killing yourself  How is depression diagnosed? Your healthcare provider will ask about your symptoms and how long you have had them  He or she will ask if you have any family members with depression  Tell your healthcare provider about any stressful events in your life  He or she may ask about any other health conditions or medicines you take  How is depression treated? · Therapy  may be used to treat your depression   A therapist will help you learn to cope with your thoughts and feelings  This can be done alone or in a group  It may also be done with family members or a significant other  · Antidepressant medicine  may be given to improve or balance your mood  You may need to take this medicine for several weeks before you begin to feel better  Tell your healthcare provider about any side effects or problems you have with your medicine  The type or amount of medicine may need to be changed  How can I manage depression? · Get regular physical activity  Try to exercise for 30 minutes, 3 to 5 days a week  Work with your healthcare provider to develop an exercise plan that you enjoy  Physical activity may improve your symptoms  · Get enough sleep  Create a routine to help you relax before bed  You can listen to music, read, or do yoga  Try to go to bed and wake up at the same time every day  Sleep is important for emotional health  · Eat a variety of healthy foods from all of the food groups  A healthy meal plan is low in fat, salt, and added sugar  Ask your healthcare provider for more information about a meal plan that is right for you  · Do not drink alcohol or use drugs  Alcohol and drugs can make your symptoms worse  Call 911 for any of the following:   · You think about harming yourself or someone else  When should I contact my healthcare provider? · Your symptoms do not improve  · You cannot make it to your next appointment  · You have new symptoms  · You have questions or concerns about your condition or care  CARE AGREEMENT:   You have the right to help plan your care  Learn about your health condition and how it may be treated  Discuss treatment options with your caregivers to decide what care you want to receive  You always have the right to refuse treatment  The above information is an  only  It is not intended as medical advice for individual conditions or treatments   Talk to your doctor, nurse or pharmacist before following any medical regimen to see if it is safe and effective for you  © 2017 [address] [employer] Information is for End User's use only and may not be sold, redistributed or otherwise used for commercial purposes  All illustrations and images included in CareNotes® are the copyrighted property of [employer]  or Sonido Aranda  Cigarette Smoking and Your Health   WHAT YOU NEED TO KNOW:   What are the risks to my health if I smoke tobacco?  Nicotine and other chemicals found in tobacco damage every cell in your body  Even if you are a light smoker, you have an increased risk for cancer, heart disease, and lung disease  If you are pregnant or have diabetes, smoking increases your risk for complications  What are the benefits to my health if I stop smoking? · You decrease respiratory symptoms such as coughing, wheezing, and shortness of breath  · You reduce your risk for cancers of the lung, mouth, throat, kidney, bladder, pancreas, stomach, and cervix  If you already have cancer, you increase the benefits of chemotherapy  You also reduce your risk for cancer returning or a second cancer from developing  · You reduce your risk for heart disease, blood clots, heart attack, and stroke  · You reduce your risk for lung infections, and diseases such as pneumonia, asthma, chronic bronchitis, and emphysema  · Your circulation improves  More oxygen can be delivered to your body  If you have diabetes, you lower your risk for complications, such as kidney, artery, and eye diseases  You also lower your risk for nerve damage  Nerve damage can lead to amputations, poor vision, and blindness  · You improve your body's ability to heal and to fight infections  What are the health benefits to others if I stop smoking? Tobacco is harmful to nonsmokers who breathe in your secondhand smoke   The following are ways the health of others around you may improve when you stop smoking:  · You lower the risks for lung cancer and heart disease in nonsmoking adults  · If you are pregnant, you lower the risk for miscarriage, early delivery, low birth weight, and stillbirth  You also lower your baby's risk for SIDS, obesity, developmental delay, and neurobehavioral problems, such as ADHD  · If you have children, you lower their risk for ear infections, colds, pneumonia, bronchitis, and asthma  Where can I find more information and support to stop smoking? · [employer]  Phone: 5- 029 - [phone]  Web Address: www [employer]  CARE AGREEMENT:   You have the right to help plan your care  Learn about your health condition and how it may be treated  Discuss treatment options with your caregivers to decide what care you want to receive  You always have the right to refuse treatment  The above information is an  only  It is not intended as medical advice for individual conditions or treatments  Talk to your doctor, nurse or pharmacist before following any medical regimen to see if it is safe and effective for you  © 2017 [facility] [facility] Information is for End User's use only and may not be sold, redistributed or otherwise used for commercial purposes  All illustrations and images included in CareNotes® are the copyrighted property of [employer]  or Sonido Aranda  How to Stop Smoking   WHAT YOU NEED TO KNOW:   Why should I stop smoking? You will improve your health and the health of others around you if you stop smoking  Your risk for heart and lung disease, cancer, stroke, heart attack, and vision problems will also decrease  You can benefit from quitting no matter how long you have smoked  How can I prepare to stop smoking? Nicotine is a highly addictive drug found in cigarettes  Withdrawal symptoms can happen when you stop smoking and make it hard to quit   These include anxiety, depression, irritability, trouble sleeping, and increased appetite  You increase your chances of success if you prepare to quit  · Set a quit date  Yulissa Shall a date that is within the next 2 weeks  Do not pick a day that you think may be stressful or busy  Write down the day or [place] it on your calender  · Tell friends and family that you plan to quit  Explain that you may have withdrawal symptoms when you try to quit  Ask them to support you  They may be able to encourage you and help reduce your stress to make it easier for you to quit  · Make a list of your reasons for quitting  Put the list somewhere you will see it every day, such as your refrigerator  You can look at the list when you have a craving  · Remove all tobacco and nicotine products from your home, car, and workplace  Also, remove anything else that will tempt you to smoke, such as lighters, matches, or ashtrays  Clean your car, home, and places at work that smell like smoke  The smell of smoke can trigger a craving  · Identify triggers that make you want to smoke  This may include activities, feelings, or people  Also write down 1 way you can deal with each of your triggers  For example, if you want to smoke as soon as you wake up, plan another activity during this time, such as exercise  · Make a plan for how you will quit  Learn about the tools that can help you quit, such as medicine, counseling, or nicotine replacement therapy  Choose at least 2 options to help you quit  What are some tools to help me stop smoking? · Counseling  from a trained healthcare provider can provide you with support and skills to quit smoking  The provider will also teach you to manage your withdrawal symptoms and cravings  You may receive counseling from one counselor, in group therapy, or through phone therapy called a quit line  · Nicotine replacement therapy (NRT)  such as nicotine patches, gum, or lozenges may help reduce your nicotine cravings   You may get these without a doctor's order  Do not use e-cigarettes or smokeless tobacco in place of cigarettes or to help you quit  They still contain nicotine  · Prescription medicines  such as nasal sprays or nicotine inhalers may help reduce your withdrawal symptoms  Other medicines may also be used to reduce your urge to smoke  Ask your healthcare provider about these medicines  You may need to start certain medicines 2 weeks before your quit date for them to work well  · Hypnosis  is a practice that helps guide you through thoughts and feelings  Hypnosis may help decrease your cravings and make you more willing to quit  · Acupuncture therapy  uses very thin needles to balance energy channels in the body  This is thought to help decrease cravings and symptoms of nicotine withdrawal      · Support groups  let you talk to others who are trying to quit or have already quit  It may be helpful to speak with others about how they quit  How can I manage my cravings? · Avoid situations, people, and places that tempt you to smoke  Go to nonsmoking places, such as libraries or restaurants  Understand what tempts you and try to avoid these things  · Keep your hands busy  Hold things such as a stress ball or pen  · Put candy or toothpicks in your mouth  Keep lollipops, sugarless gum, or toothpicks with you at all times  · Do not have alcohol or caffeine  These drinks may tempt you to smoke  Drink healthy liquids such as water or juice instead  · Reward yourself when you resist your cravings  Rewards will motivate you and help you stay positive  · Do an activity that distracts you from your craving  Examples include going for a walk, exercising, or cleaning  What should I know about weight gain after I quit? You may gain a few pounds after you quit smoking  It is healthier for you to gain a few pounds than to continue to smoke  The following can help you prevent weight gain:  · Eat healthy foods    These include fruits, vegetables, whole-grain breads, low-fat dairy products, beans, lean meats, and fish  Eat healthy snacks, such as low-fat yogurt, if you get hungry between meals  · Drink water before, during, and between meals  This will make your stomach feel full and help prevent you from overeating  Ask your healthcare provider how much liquid to drink each day and which liquids are best for you  · Exercise  Take a walk or do some kind of exercise every day  Ask your healthcare provider what exercise is right for you  This may help reduce your cravings and reduce stress  Where can I find support and more information? · [employer]  Phone: 3- 620 - [phone]  Web Address: www [employer]  CARE AGREEMENT:   You have the right to help plan your care  Learn about your health condition and how it may be treated  Discuss treatment options with your caregivers to decide what care you want to receive  You always have the right to refuse treatment  The above information is an  only  It is not intended as medical advice for individual conditions or treatments  Talk to your doctor, nurse or pharmacist before following any medical regimen to see if it is safe and effective for you  © 2017 [address] Information is for End User's use only and may not be sold, redistributed or otherwise used for commercial purposes  All illustrations and images included in CareNotes® are the copyrighted property of [employer]  or Sonido Aranda

## 2019-11-26 NOTE — PLAN OF CARE
Problem: SELF HARM/SUICIDALITY  Goal: Will have no self-injury during hospital stay  Description  INTERVENTIONS:  - Q 15 MINUTES: Routine safety checks  - Q WAKING SHIFT & PRN: Assess risk to determine if routine checks are adequate to maintain patient safety  - Encourage patient to participate actively in care by formulating a plan to combat response to suicidal ideation, identify supports and resources  Outcome: Progressing     Problem: DEPRESSION  Goal: Will be euthymic at discharge  Description  INTERVENTIONS:  - Administer medication as ordered  - Provide emotional support via 1:1 interaction with staff  - Encourage involvement in milieu/groups/activities  - Monitor for social isolation  Outcome: Progressing     Problem: SUBSTANCE USE/ABUSE  Goal: Will have no detox symptoms and will verbalize plan for changing substance-related behavior  Description  INTERVENTIONS:  - Monitor physical status and assess for symptoms of withdrawal  - Administer medication as ordered  - Provide emotional support with 1 on 1 interaction with staff  - Encourage recovery focused program/ addiction education  - Assess for verbalization of changing behaviors related to substance abuse  - Initiate consults and referrals as appropriate (Case Management, Spiritual Care, etc )  Outcome: Progressing  Goal: By discharge, will develop insight into their chemical dependency and sustain motivation to continue in recovery  Description  INTERVENTIONS:  - Attends all daily group sessions and scheduled AA groups  - Actively practices coping skills through participation in the therapeutic community and adherence to program rules  - Reviews and completes assignments from individual treatment plan  - Assist patient development of understanding of their personal cycle of addiction and relapse triggers  Outcome: Progressing  Goal: By discharge, patient will have ongoing treatment plan addressing chemical dependency  Description  INTERVENTIONS:  - Assist patient with resources and/or appointments for ongoing recovery based living  Outcome: Progressing     Problem: Nutrition/Hydration-ADULT  Goal: Nutrient/Hydration intake appropriate for improving, restoring or maintaining nutritional needs  Description  Monitor and assess patient's nutrition/hydration status for malnutrition  Collaborate with interdisciplinary team and initiate plan and interventions as ordered  Monitor patient's weight and dietary intake as ordered or per policy  Utilize nutrition screening tool and intervene as necessary  Determine patient's food preferences and provide high-protein, high-caloric foods as appropriate       INTERVENTIONS:  - Monitor oral intake, urinary output, labs, and treatment plans  - Assess nutrition and hydration status and recommend course of action  - Evaluate amount of meals eaten  - Assist patient with eating if necessary   - Allow adequate time for meals  - Recommend/ encourage appropriate diets, oral nutritional supplements, and vitamin/mineral supplements  - Order, calculate, and assess calorie counts as needed  - Recommend, monitor, and adjust tube feedings and TPN/PPN based on assessed needs  - Assess need for intravenous fluids  - Provide specific nutrition/hydration education as appropriate  - Include patient/family/caregiver in decisions related to nutrition  Outcome: Progressing

## 2019-11-26 NOTE — PROGRESS NOTES
Ashlie#  SAH:[id number] F  [id number]    [id number]  Adm Date: 11/20/2019 1627  4:27 PM   ATT PHY: [address]         [facility]     The patient was seen after reviewing the chart and discussing the case with caring staff  Patient examined at bedside, patient has no acute issues  Of note, patient is scheduled for discharge tomorrow  Patient is medically cleared for discharge  Medication list has been reconciled  Scripts have been filled out  Objective     Vitals:    11/26/19 0735   BP: 129/69   Pulse: 94   Resp: 18   Temp: 99 3 °F (37 4 °C)   SpO2: 96%       General Appearance: Awake and Alert  No acute distress  HEENT: Normocephalic, atraumatic  PERRLA, EOMI, MMM  Heart: RRR, no murmurs  Normal S1 and S2   Lungs: CTA bilaterally with fair air entry  Assessment     Alexander Pino is a(n) 47y o  year old female with depression  Medical Clearance: Patient is medically cleared for discharge  Medication list has been reconciled  Scripts have been addressed      1  Cardiac with history of Hypertension  Lisinopril 20mg once daily  2  Hypothyroidism  Levothyroxine 125mcg once daily  3  Tobacco Use  Nicotine gum PRN  4  Methamphetamine Use  Defer to psychiatry  5  Constipation  Senokot S   6  New vitamin-D deficiency  Vitamin-D supplements  The patient was discussed with Dr Milton Zuniga and he is in agreement with the above note

## 2019-11-26 NOTE — PROGRESS NOTES
The patient requesting as needed tylenol for complaints of 3/10 generalized "achiness," medication administered as per physician's orders  Will continue to monitor

## 2019-11-27 VITALS
WEIGHT: 169.53 LBS | OXYGEN SATURATION: 97 % | HEART RATE: 114 BPM | BODY MASS INDEX: 30.04 KG/M2 | SYSTOLIC BLOOD PRESSURE: 117 MMHG | RESPIRATION RATE: 20 BRPM | TEMPERATURE: 96.6 F | HEIGHT: 63 IN | DIASTOLIC BLOOD PRESSURE: 67 MMHG

## 2019-11-27 PROCEDURE — 99239 HOSP IP/OBS DSCHRG MGMT >30: CPT | Performed by: NURSE PRACTITIONER

## 2019-11-27 RX ADMIN — ESCITALOPRAM OXALATE 10 MG: 10 TABLET ORAL at 08:04

## 2019-11-27 RX ADMIN — LEVOTHYROXINE SODIUM 125 MCG: 100 TABLET ORAL at 05:56

## 2019-11-27 RX ADMIN — VITAMIN D, TAB 1000IU (100/BT) 1000 UNITS: 25 TAB at 08:04

## 2019-11-27 NOTE — PROGRESS NOTES
Patient appears to be sleeping throughout the night during 7 minute safety checks  No behavioral issues  No complaints or concerns  Will continue to monitor safety and behavioral issues  Will continue to monitor safety and behaviors every 7 minutes

## 2019-11-27 NOTE — BH TRANSITION RECORD
Contact Information: If you have any questions, concerns, pended studies, tests and/or procedures, or emergencies regarding your inpatient behavioral health visit  Please contact [facility] adult behavioral health unit [phone] and ask to speak to a , nurse or physician  A contact is available 24 hours/ 7 days a week at this number  Summary of Procedures Performed During your Stay:  Below is a list of major procedures performed during your hospital stay and a summary of results:  - No major procedures performed  Pending Studies (From admission, onward)     Start     Ordered    12/11/19 0000  TSH, 3rd generation  Once      11/21/19 4047              If studies are pending at discharge, follow up with your PCP and/or referring provider

## 2019-11-27 NOTE — PROGRESS NOTES
Patient visible in milieu, participating in group, socializing with staff and peers, pleasant and cooperative in interaction  Patient endorsed anxiety over pending discharge as she is looking forward to go home  Patient denies depression, SI/HI, hallucinations  Remains medication compliant and on 7" checks for safety and behaviors

## 2019-11-27 NOTE — SOCIAL WORK
CM met with PT for PT check in  PT reported she is looking forward to D/C home today and that she is also looking forward to seeing her grandson  PT indicated that she is feeling better and that her anxiety and depression have improved  PT denies SI/HI/AH/VH  CM reviewed letter with PT created for PT to submit to her employer, PT approved will be placed in PT D/C folder for PT to directly submit  CM reviewed follow up care and appointments, PT in agreement with all  CM inquired if there was anything additional PT would be in need of  PT declined
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
## 2019-11-27 NOTE — PROGRESS NOTES
11/27/19 0945   Team Meeting   Meeting Type Daily Rounds   Team Members Present   Team Members Present Physician;Nurse;; Occupational Therapist   Physician Team Member Dr Genaro Lutz MD; Anastasiia Quispe[address]    Nursing Team Member Michelle Youssef RN    Social [facility]    OT Team Member Unique Jacob [place]    Patient/Family Present   Patient Present No   Patient's Family Present No     Pt discharging today to home

## 2019-11-27 NOTE — PLAN OF CARE
Problem: SELF HARM/SUICIDALITY  Goal: Will have no self-injury during hospital stay  Description  INTERVENTIONS:  - Q 15 MINUTES: Routine safety checks  - Q WAKING SHIFT & PRN: Assess risk to determine if routine checks are adequate to maintain patient safety  - Encourage patient to participate actively in care by formulating a plan to combat response to suicidal ideation, identify supports and resources  Outcome: Adequate for Discharge     Problem: DEPRESSION  Goal: Will be euthymic at discharge  Description  INTERVENTIONS:  - Administer medication as ordered  - Provide emotional support via 1:1 interaction with staff  - Encourage involvement in milieu/groups/activities  - Monitor for social isolation  Outcome: Adequate for Discharge     Problem: SUBSTANCE USE/ABUSE  Goal: Will have no detox symptoms and will verbalize plan for changing substance-related behavior  Description  INTERVENTIONS:  - Monitor physical status and assess for symptoms of withdrawal  - Administer medication as ordered  - Provide emotional support with 1 on 1 interaction with staff  - Encourage recovery focused program/ addiction education  - Assess for verbalization of changing behaviors related to substance abuse  - Initiate consults and referrals as appropriate (Case Management, Spiritual Care, etc )  Outcome: Adequate for Discharge  Goal: By discharge, will develop insight into their chemical dependency and sustain motivation to continue in recovery  Description  INTERVENTIONS:  - Attends all daily group sessions and scheduled AA groups  - Actively practices coping skills through participation in the therapeutic community and adherence to program rules  - Reviews and completes assignments from individual treatment plan  - Assist patient development of understanding of their personal cycle of addiction and relapse triggers  Outcome: Adequate for Discharge  Goal: By discharge, patient will have ongoing treatment plan addressing chemical dependency  Description  INTERVENTIONS:  - Assist patient with resources and/or appointments for ongoing recovery based living  Outcome: Adequate for Discharge     Problem: Nutrition/Hydration-ADULT  Goal: Nutrient/Hydration intake appropriate for improving, restoring or maintaining nutritional needs  Description  Monitor and assess patient's nutrition/hydration status for malnutrition  Collaborate with interdisciplinary team and initiate plan and interventions as ordered  Monitor patient's weight and dietary intake as ordered or per policy  Utilize nutrition screening tool and intervene as necessary  Determine patient's food preferences and provide high-protein, high-caloric foods as appropriate       INTERVENTIONS:  - Monitor oral intake, urinary output, labs, and treatment plans  - Assess nutrition and hydration status and recommend course of action  - Evaluate amount of meals eaten  - Assist patient with eating if necessary   - Allow adequate time for meals  - Recommend/ encourage appropriate diets, oral nutritional supplements, and vitamin/mineral supplements  - Order, calculate, and assess calorie counts as needed  - Recommend, monitor, and adjust tube feedings and TPN/PPN based on assessed needs  - Assess need for intravenous fluids  - Provide specific nutrition/hydration education as appropriate  - Include patient/family/caregiver in decisions related to nutrition  Outcome: Adequate for Discharge

## 2019-11-27 NOTE — NURSING NOTE
Discharge instructions reviewed with patient, verbalized understanding of same  Patient discharged with all discharge instructions and all belongings, ambulatory, with daughter

## 2019-11-27 NOTE — PROGRESS NOTES
Upon reassessment patient was sleeping without difficulty  Will continue to monitor safety and behaviors every 7 minutes

## 2019-11-27 NOTE — PROGRESS NOTES
Ashlie#  AQW:[birth year] F  KRX:[id number]    [id number]  Adm Date: 11/20/2019 1627  4:27 PM   ATT PHY:   [address]         Subjective     The patient was seen after reviewing the chart and discussing the case with caring staff  Patient examined at bedside, patient has no acute issues  Of note, patient is scheduled for discharge today Patient is medically cleared for discharge  Medication list has been reconciled  Scripts have been filled out  Objective     Vitals:    11/27/19 0716   BP: 117/67   Pulse: (!) 114   Resp: 20   Temp: (!) 96 6 °F (35 9 °C)   SpO2: 97%       General Appearance: Awake and Alert  No acute distress  HEENT: Normocephalic, atraumatic  PERRLA, EOMI, MMM  Heart: RRR, no murmurs  Normal S1 and S2   Lungs: CTA bilaterally with fair air entry  Assessment     Willie Shabazz is a(n) 47y o  year old female with depression  Medical Clearance: Patient is medically cleared for discharge  Medication list has been reconciled  Scripts have been addressed      1  Cardiac with history of Hypertension  Lisinopril 20mg once daily  2  Hypothyroidism  Levothyroxine 125mcg once daily  3  Tobacco Use  Nicotine gum PRN  4  Methamphetamine Use  Defer to psychiatry  5  Constipation  Senokot S   6  New vitamin-D deficiency  Vitamin-D supplements

## 2019-11-27 NOTE — PROGRESS NOTES
Patient complained of 7/10 restless anxiety due to a phone call she had prior from her daughter  Patient stated, "My daughter talks to me like I am a child and the situation upsets me " Therapeutic listening was provided from thi writer  Patient stated was grateful for the talk  Gave PRN 0 5 mg Ativan for symptoms  Velna Kussmaul scale is "12 " Will continue to monitor safety and behaviors every 7 minutes

## 2019-11-27 NOTE — PROGRESS NOTES
Patient complained of insomnia due to restless anxiety  Patient stated, "I am excited about going home but yet nervous about some of the stressors I have waiting for me " "Although, I am so excited to see my grandson!" Gave PRN Atarax for symptoms  Rouse scale was "15 " Will continue to monitor safety and behaviors every 7 minutes

## 2019-11-27 NOTE — PROGRESS NOTES
Patient out in the milieu social with peers and staff  Ate HS snack and attended group  Upon approach patient's mood and affect is blunted and anxious  Patient stated her anxiety was lowered somewhat from the previous PRN Ativan that was given at 2042  Denies SI/HI/hallucinations/pain/depression  Took HS medication without difficulty  Patient requested PRN Trazodone for sleep  Will continue to monitor safety and behaviors every 7 minutes

## 2019-11-27 NOTE — PROGRESS NOTES
The patient left with the following items white underwear,tan underwear,2 blk  Underwear,1 blk  Bra, blk  [employer]  Stretch pants,tan long sleeve,blk  Sneakers  blk blue socks,2 pr  blk pink socks,grey cat slippers,blk  Long sleeve sweater,blue jeans,pink lace shirt,blk coat,blue plaid pants,blk  Bag,grey 31 cosmetic bag,green eyeglasses,blue eyeglass case,rust color sweater,sensodyne,deodorant,ponds skin creme,coconut shampoo and conditioner,hair gel,blue pants,blk  And white stripe top,3 blk  Tops,2 pr  Multi color shorts,2 white bras,pink comb,and meds from the pharmacy

## 2020-03-17 ENCOUNTER — TRANSCRIBE ORDERS (OUTPATIENT)
Dept: ADMINISTRATIVE | Facility: HOSPITAL | Age: 50
End: 2020-03-17

## 2020-03-17 DIAGNOSIS — N64.59 INVERTED NIPPLE: Primary | ICD-10-CM

## 2020-05-17 ENCOUNTER — HOSPITAL ENCOUNTER (EMERGENCY)
Facility: HOSPITAL | Age: 50
Discharge: HOME | End: 2020-05-17
Attending: EMERGENCY MEDICINE | Admitting: EMERGENCY MEDICINE
Payer: COMMERCIAL

## 2020-05-17 VITALS
SYSTOLIC BLOOD PRESSURE: 120 MMHG | DIASTOLIC BLOOD PRESSURE: 71 MMHG | OXYGEN SATURATION: 100 % | HEART RATE: 78 BPM | HEIGHT: 63 IN | BODY MASS INDEX: 31.89 KG/M2 | RESPIRATION RATE: 16 BRPM | WEIGHT: 180 LBS | TEMPERATURE: 99.2 F
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
## 2020-05-17 PROCEDURE — 90471 IMMUNIZATION ADMIN: CPT

## 2020-05-17 PROCEDURE — 99284 EMERGENCY DEPT VISIT MOD MDM: CPT | Performed by: EMERGENCY MEDICINE

## 2020-05-17 PROCEDURE — 99283 EMERGENCY DEPT VISIT LOW MDM: CPT

## 2020-05-17 PROCEDURE — 90715 TDAP VACCINE 7 YRS/> IM: CPT | Performed by: EMERGENCY MEDICINE

## 2020-05-17 RX ORDER — AMOXICILLIN AND CLAVULANATE POTASSIUM 875; 125 MG/1; MG/1
1 TABLET, FILM COATED ORAL EVERY 12 HOURS
Qty: 10 TABLET | Refills: 0 | Status: SHIPPED | OUTPATIENT
Start: 2020-05-17 | End: 2020-05-22

## 2020-05-17 RX ADMIN — TETANUS TOXOID, REDUCED DIPHTHERIA TOXOID AND ACELLULAR PERTUSSIS VACCINE, ADSORBED 0.5 ML: 5; 2.5; 8; 8; 2.5 SUSPENSION INTRAMUSCULAR at 08:41

## 2020-07-28 ENCOUNTER — OFFICE VISIT (OUTPATIENT)
Dept: URGENT CARE | Facility: CLINIC | Age: 50
End: 2020-07-28
Payer: COMMERCIAL

## 2020-07-28 VITALS
TEMPERATURE: 97.1 F | RESPIRATION RATE: 18 BRPM | HEIGHT: 63 IN | OXYGEN SATURATION: 98 % | WEIGHT: 185 LBS | BODY MASS INDEX: 32.78 KG/M2 | HEART RATE: 86 BPM

## 2020-07-28 DIAGNOSIS — Z11.59 SCREENING FOR VIRAL DISEASE: Primary | ICD-10-CM

## 2020-07-28 PROCEDURE — U0003 INFECTIOUS AGENT DETECTION BY NUCLEIC ACID (DNA OR RNA); SEVERE ACUTE RESPIRATORY SYNDROME CORONAVIRUS 2 (SARS-COV-2) (CORONAVIRUS DISEASE [COVID-19]), AMPLIFIED PROBE TECHNIQUE, MAKING USE OF HIGH THROUGHPUT TECHNOLOGIES AS DESCRIBED BY CMS-2020-01-R: HCPCS | Performed by: PHYSICIAN ASSISTANT

## 2020-07-28 PROCEDURE — 99212 OFFICE O/P EST SF 10 MIN: CPT | Performed by: PHYSICIAN ASSISTANT

## 2020-07-28 NOTE — PATIENT INSTRUCTIONS

## 2020-07-28 NOTE — PROGRESS NOTES
[facility]        NAME: Rosangela Li is a 45 y o  female  : 1970    MRN: [id number]  DATE: 2020  TIME: 1:55 PM    Assessment and Plan   Screening for viral disease [Z11 59]  1  Screening for viral disease  MISC COVID-19 TEST- Office Collection         Patient Instructions       Follow up with PCP in 3-5 days  Proceed to  ER if symptoms worsen  Chief Complaint     Chief Complaint   Patient presents with    COVID-19     here for COVID testing, no exposure, no symptoms  History of Present Illness       Patient is here for testing for COVID-19  She denies any known exposure denies any symptomatology  There is no cough shortness of breath fever chills myalgias headaches diarrhea  Review of Systems   Review of Systems   Constitutional: Negative for chills and fever  Respiratory: Negative for cough and shortness of breath  Gastrointestinal: Negative for diarrhea  Musculoskeletal: Negative for myalgias  Neurological: Negative for headaches           Current Medications       Current Outpatient Medications:     ALPRAZolam (XANAX) 0 5 mg tablet, Take 1 tablet (0 5 mg total) by mouth 3 (three) times a day as needed for anxiety for up to 10 days, Disp: , Rfl: 0    buPROPion HCl (WELLBUTRIN PO), Take by mouth, Disp: , Rfl:     cholecalciferol (VITAMIN D3) 1,000 units tablet, Take 1 tablet (1,000 Units total) by mouth daily, Disp: 30 tablet, Rfl: 1    levothyroxine 125 mcg tablet, Take 1 tablet (125 mcg total) by mouth daily in the early morning (Patient taking differently: Take 175 mcg by mouth daily in the early morning ), Disp: 30 tablet, Rfl: 1    lisinopril (ZESTRIL) 20 mg tablet, Take 1 tablet (20 mg total) by mouth daily at bedtime, Disp: 30 tablet, Rfl: 1    escitalopram (LEXAPRO) 10 mg tablet, Take 1 tablet (10 mg total) by mouth daily (Patient not taking: Reported on 2020), Disp: 30 tablet, Rfl: 0    senna-docusate sodium (SENOKOT S) 8 6-50 mg per tablet, Take 1 tablet by mouth daily at bedtime (Patient not taking: Reported on 2020), Disp: 30 tablet, Rfl: 0    Current Allergies     Allergies as of 2020    (No Known Allergies)            The following portions of the patient's history were reviewed and updated as appropriate: allergies, current medications, past family history, past medical history, past social history, past surgical history and problem list      Past Medical History:   Diagnosis Date    Anxiety     Depression     Disease of thyroid gland     Hypertension        Past Surgical History:   Procedure Laterality Date     SECTION         History reviewed  No pertinent family history  Medications have been verified  Objective   Resp 16   Ht 5' 3" (1 6 m)   Wt 83 9 kg (185 lb)   BMI 32 77 kg/m²        Physical Exam     Physical Exam   Constitutional: She appears well-developed and well-nourished  HENT:   Head: Normocephalic and atraumatic  Cardiovascular: Normal rate, regular rhythm and normal heart sounds  Pulmonary/Chest: Effort normal and breath sounds normal    Neurological: She is alert  Skin: Skin is warm  Psychiatric: She has a normal mood and affect  Nursing note and vitals reviewed

## 2020-07-28 NOTE — LETTER
July 28, 2020     Patient: Cuca Maradiaga   YOB: 1970   Date of Visit: 7/28/2020       To Whom It May Concern: It is my medical opinion that Cuca Maradiaga should not attend work until [facility] testing results return  If you have any questions or concerns, please don't hesitate to call           Sincerely,        Sharath Young PA-C    CC: No Recipients

## 2020-07-29 LAB — SARS-COV-2 RNA SPEC QL NAA+PROBE: NOT DETECTED

## 2021-02-06 ENCOUNTER — HOSPITAL ENCOUNTER (EMERGENCY)
Facility: HOSPITAL | Age: 51
Discharge: HOME | End: 2021-02-06
Attending: EMERGENCY MEDICINE | Admitting: EMERGENCY MEDICINE
Payer: COMMERCIAL

## 2021-02-06 ENCOUNTER — APPOINTMENT (EMERGENCY)
Dept: CT IMAGING | Facility: HOSPITAL | Age: 51
End: 2021-02-06
Payer: COMMERCIAL

## 2021-02-06 VITALS
HEART RATE: 87 BPM | WEIGHT: 180 LBS | DIASTOLIC BLOOD PRESSURE: 91 MMHG | HEIGHT: 67 IN | TEMPERATURE: 96.8 F | OXYGEN SATURATION: 100 % | RESPIRATION RATE: 20 BRPM | SYSTOLIC BLOOD PRESSURE: 136 MMHG | BODY MASS INDEX: 28.25 KG/M2

## 2021-02-06 DIAGNOSIS — K11.5 SIALOLITHIASIS OF SUBMANDIBULAR GLAND: ICD-10-CM

## 2021-02-06 DIAGNOSIS — K11.20 SIALOADENITIS OF SUBMANDIBULAR GLAND: Primary | ICD-10-CM

## 2021-02-06 LAB
ALBUMIN SERPL BCP-MCNC: 4 G/DL (ref 3.5–5.7)
ALP SERPL-CCNC: 73 U/L (ref 40–150)
ALT SERPL W P-5'-P-CCNC: 14 U/L (ref 7–52)
ANION GAP SERPL CALCULATED.3IONS-SCNC: 7 MMOL/L (ref 4–13)
AST SERPL W P-5'-P-CCNC: 13 U/L (ref 13–39)
BASOPHILS # BLD AUTO: 0 THOUSANDS/ΜL (ref 0–0.1)
BASOPHILS NFR BLD AUTO: 1 % (ref 0–2)
BILIRUB SERPL-MCNC: 0.5 MG/DL (ref 0.2–1)
BUN SERPL-MCNC: 13 MG/DL (ref 7–25)
CALCIUM SERPL-MCNC: 9.4 MG/DL (ref 8.6–10.5)
CHLORIDE SERPL-SCNC: 105 MMOL/L (ref 98–107)
CO2 SERPL-SCNC: 27 MMOL/L (ref 21–31)
CREAT SERPL-MCNC: 0.75 MG/DL (ref 0.6–1.2)
EOSINOPHIL # BLD AUTO: 0.1 THOUSAND/ΜL (ref 0–0.61)
EOSINOPHIL NFR BLD AUTO: 1 % (ref 0–5)
ERYTHROCYTE [DISTWIDTH] IN BLOOD BY AUTOMATED COUNT: 12.8 % (ref 11.5–14.5)
GFR SERPL CREATININE-BSD FRML MDRD: 93 ML/MIN/1.73SQ M
GLUCOSE SERPL-MCNC: 96 MG/DL (ref 65–99)
HCT VFR BLD AUTO: 41.9 % (ref 42–47)
HGB BLD-MCNC: 14.4 G/DL (ref 12–16)
LYMPHOCYTES # BLD AUTO: 1.7 THOUSANDS/ΜL (ref 0.6–4.47)
LYMPHOCYTES NFR BLD AUTO: 21 % (ref 21–51)
MCH RBC QN AUTO: 31.8 PG (ref 26–34)
MCHC RBC AUTO-ENTMCNC: 34.4 G/DL (ref 31–37)
MCV RBC AUTO: 93 FL (ref 81–99)
MONOCYTES # BLD AUTO: 0.5 THOUSAND/ΜL (ref 0.17–1.22)
MONOCYTES NFR BLD AUTO: 6 % (ref 2–12)
NEUTROPHILS # BLD AUTO: 6 THOUSANDS/ΜL (ref 1.4–6.5)
NEUTS SEG NFR BLD AUTO: 72 % (ref 42–75)
PLATELET # BLD AUTO: 258 THOUSANDS/UL (ref 149–390)
PMV BLD AUTO: 7.9 FL (ref 8.6–11.7)
POTASSIUM SERPL-SCNC: 4.3 MMOL/L (ref 3.5–5.5)
PROT SERPL-MCNC: 7 G/DL (ref 6.4–8.9)
RBC # BLD AUTO: 4.53 MILLION/UL (ref 3.9–5.2)
S PYO DNA THROAT QL NAA+PROBE: NORMAL
SODIUM SERPL-SCNC: 139 MMOL/L (ref 134–143)
WBC # BLD AUTO: 8.3 THOUSAND/UL (ref 4.8–10.8)

## 2021-02-06 PROCEDURE — 99284 EMERGENCY DEPT VISIT MOD MDM: CPT | Performed by: EMERGENCY MEDICINE

## 2021-02-06 PROCEDURE — 80053 COMPREHEN METABOLIC PANEL: CPT | Performed by: EMERGENCY MEDICINE

## 2021-02-06 PROCEDURE — 70491 CT SOFT TISSUE NECK W/DYE: CPT

## 2021-02-06 PROCEDURE — 99284 EMERGENCY DEPT VISIT MOD MDM: CPT

## 2021-02-06 PROCEDURE — G1004 CDSM NDSC: HCPCS

## 2021-02-06 PROCEDURE — 96365 THER/PROPH/DIAG IV INF INIT: CPT

## 2021-02-06 PROCEDURE — 36415 COLL VENOUS BLD VENIPUNCTURE: CPT | Performed by: EMERGENCY MEDICINE

## 2021-02-06 PROCEDURE — 85025 COMPLETE CBC W/AUTO DIFF WBC: CPT | Performed by: EMERGENCY MEDICINE

## 2021-02-06 PROCEDURE — 87651 STREP A DNA AMP PROBE: CPT | Performed by: EMERGENCY MEDICINE

## 2021-02-06 RX ORDER — CLINDAMYCIN HYDROCHLORIDE 300 MG/1
300 CAPSULE ORAL 4 TIMES DAILY
Qty: 28 CAPSULE | Refills: 0 | Status: SHIPPED | OUTPATIENT
Start: 2021-02-06 | End: 2021-02-13

## 2021-02-06 RX ORDER — CLINDAMYCIN PHOSPHATE 600 MG/50ML
600 INJECTION INTRAVENOUS ONCE
Status: COMPLETED | OUTPATIENT
Start: 2021-02-06 | End: 2021-02-06

## 2021-02-06 RX ADMIN — CLINDAMYCIN IN 5 PERCENT DEXTROSE 600 MG: 12 INJECTION, SOLUTION INTRAVENOUS at 09:46

## 2021-02-06 RX ADMIN — IOHEXOL 85 ML: 350 INJECTION, SOLUTION INTRAVENOUS at 10:21

## 2021-02-06 NOTE — DISCHARGE INSTRUCTIONS
Your going to need to follow-up with an ENT doctor within a week or so  Use Motrin 4-600 mg every 6 hours for pain and inflammation as well as continuing to suck on sour candy  Take clindamycin 1 pill every 6 hours for a week  Take a probiotic or yogurt daily while on this medication  Please call the ENT doctor on Monday for repeat evaluation, and return to the ER with any new, worsening, or concerning issues

## 2021-02-06 NOTE — ED PROVIDER NOTES
History  Chief Complaint   Patient presents with    Jaw Pain     "blocked salavary gland"     59-year-old female presents emergency department complaining of a 3 day history of painful swallowing as well as a mass palpated under her right jaw  Patient notes she has had a submandibular gland blockage in the past when she would lived in [place] and had an ENT cyst without blocking it, and used sour candies to aid in its improvement  The patient notes that it feels similar, but she has pain with swallowing  She has been using so our candy but the symptoms have not improved  Denies any fever chills or tongue elevation  Here for evaluation  Prior to Admission Medications   Prescriptions Last Dose Informant Patient Reported? Taking? ALPRAZolam (XANAX) 0 5 mg tablet   No No   Sig: Take 1 tablet (0 5 mg total) by mouth 3 (three) times a day as needed for anxiety for up to 10 days   buPROPion HCl (WELLBUTRIN PO)   Yes No   Sig: Take by mouth   cholecalciferol (VITAMIN D3) 1,000 units tablet   No No   Sig: Take 1 tablet (1,000 Units total) by mouth daily   escitalopram (LEXAPRO) 10 mg tablet   No No   Sig: Take 1 tablet (10 mg total) by mouth daily   Patient not taking: Reported on 2020   levothyroxine 125 mcg tablet   No No   Sig: Take 1 tablet (125 mcg total) by mouth daily in the early morning   Patient taking differently: Take 175 mcg by mouth daily in the early morning    lisinopril (ZESTRIL) 20 mg tablet   No No   Sig: Take 1 tablet (20 mg total) by mouth daily at bedtime   senna-docusate sodium (SENOKOT S) 8 6-50 mg per tablet   No No   Sig: Take 1 tablet by mouth daily at bedtime   Patient not taking: Reported on 2020      Facility-Administered Medications: None       Past Medical History:   Diagnosis Date    Anxiety     Depression     Disease of thyroid gland     Hypertension        Past Surgical History:   Procedure Laterality Date     SECTION         History reviewed   No pertinent family history  I have reviewed and agree with the history as documented  E-Cigarette/Vaping    E-Cigarette Use Never User      E-Cigarette/Vaping Substances     Social History     Tobacco Use    Smoking status: Former Smoker     Types: E-Cigarettes    Smokeless tobacco: Never Used   Substance Use Topics    Alcohol use: Yes     Comment: social    Drug use: Yes     Types: Methamphetamines       Review of Systems   Constitutional: Positive for appetite change  Negative for activity change  HENT: Positive for trouble swallowing  Negative for mouth sores, rhinorrhea and sinus pressure  Respiratory: Negative for chest tightness  Cardiovascular: Negative for chest pain  Gastrointestinal: Positive for abdominal pain  Genitourinary: Negative for difficulty urinating  Neurological: Negative for facial asymmetry  Psychiatric/Behavioral: Negative for decreased concentration  Physical Exam  Physical Exam  Constitutional:       General: She is not in acute distress  Appearance: Normal appearance  She is normal weight  She is not ill-appearing  HENT:      Head: Normocephalic  Right Ear: External ear normal       Left Ear: External ear normal       Nose: Nose normal       Mouth/Throat:      Mouth: Mucous membranes are moist       Comments: No evidence of intraoral abscess or sialolith  No significant injection noted  Eyes:      Extraocular Movements: Extraocular movements intact  Neck:      Musculoskeletal: Muscular tenderness present  Comments: There is a submandibular mass palpated on the right  Possible lymph node or possible inflamed salivary gland  Cardiovascular:      Rate and Rhythm: Normal rate  Pulses: Normal pulses  Pulmonary:      Effort: Pulmonary effort is normal       Breath sounds: Normal breath sounds  Abdominal:      General: Abdomen is flat  Musculoskeletal: Normal range of motion  Skin:     General: Skin is warm        Capillary Refill: Capillary refill takes less than 2 seconds  Neurological:      General: No focal deficit present  Mental Status: She is alert     Psychiatric:         Mood and Affect: Mood normal          Vital Signs  ED Triage Vitals [02/06/21 0910]   Temperature Pulse Respirations Blood Pressure SpO2   (!) 96 8 °F (36 °C) 87 20 136/91 100 %      Temp Source Heart Rate Source Patient Position - Orthostatic VS BP Location FiO2 (%)   Temporal Monitor Sitting Left arm --      Pain Score       6           Vitals:    02/06/21 0910   BP: 136/91   Pulse: 87   Patient Position - Orthostatic VS: Sitting         Visual Acuity      ED Medications  Medications   clindamycin (CLEOCIN) IVPB (premix in dextrose) 600 mg 50 mL (0 mg Intravenous Stopped 2/6/21 1016)   iohexol (OMNIPAQUE) 350 MG/ML injection (SINGLE-DOSE) 85 mL (85 mL Intravenous Given 2/6/21 1021)       Diagnostic Studies  Results Reviewed     Procedure Component Value Units Date/Time    Strep A PCR [[id number]]  (Normal) Collected: 02/06/21 0937    Lab Status: Final result Specimen: Throat Updated: 02/06/21 1025     STREP A PCR None Detected    CBC and differential [[id number]]  (Abnormal) Collected: 02/06/21 0937    Lab Status: Final result Specimen: Blood from Arm, Right Updated: 02/06/21 1016     WBC 8 30 Thousand/uL      RBC 4 53 Million/uL      Hemoglobin 14 4 g/dL      Hematocrit 41 9 %      MCV 93 fL      MCH 31 8 pg      MCHC 34 4 g/dL      RDW 12 8 %      MPV 7 9 fL      Platelets 255 Thousands/uL      Neutrophils Relative 72 %      Lymphocytes Relative 21 %      Monocytes Relative 6 %      Eosinophils Relative 1 %      Basophils Relative 1 %      Neutrophils Absolute 6 00 Thousands/µL      Lymphocytes Absolute 1 70 Thousands/µL      Monocytes Absolute 0 50 Thousand/µL      Eosinophils Absolute 0 10 Thousand/µL      Basophils Absolute 0 00 Thousands/µL     Comprehensive metabolic panel [[id number]] Collected: 02/06/21 4123    Lab Status: Final result Specimen: Blood from Arm, Right Updated: 02/06/21 1003     Sodium 139 mmol/L      Potassium 4 3 mmol/L      Chloride 105 mmol/L      CO2 27 mmol/L      ANION GAP 7 mmol/L      BUN 13 mg/dL      Creatinine 0 75 mg/dL      Glucose 96 mg/dL      Calcium 9 4 mg/dL      AST 13 U/L      ALT 14 U/L      Alkaline Phosphatase 73 U/L      Total Protein 7 0 g/dL      Albumin 4 0 g/dL      Total Bilirubin 0 50 mg/dL      eGFR 93 ml/min/1 73sq m     Narrative:      [place] guidelines for Chronic Kidney Disease (CKD):     Stage 1 with normal or high GFR (GFR > 90 mL/min/1 73 square meters)    Stage 2 Mild CKD (GFR = 60-89 mL/min/1 73 square meters)    Stage 3A Moderate CKD (GFR = 45-59 mL/min/1 73 square meters)    Stage 3B Moderate CKD (GFR = 30-44 mL/min/1 73 square meters)    Stage 4 Severe CKD (GFR = 15-29 mL/min/1 73 square meters)    Stage 5 End Stage CKD (GFR <15 mL/min/1 73 square meters)  Note: GFR calculation is accurate only with a steady state creatinine                 CT soft tissue neck   Final Result by Deidre Alvarez MD (02/06 1038)      Sialoadenitis involving the right submandibular and sublingual glands secondary to sialolithiasis along the course of the right submandibular duct as described above  No discrete ductal dilatation identified              Workstation performed: [id number]                    Procedures  Procedures         ED Course  ED Course as of Feb 06 2314   Sat Feb 06, 2021   1018 WBC: 8 30                                           MDM    Disposition  Final diagnoses:   Sialoadenitis of submandibular gland   Sialolithiasis of submandibular gland     Time reflects when diagnosis was documented in both MDM as applicable and the Disposition within this note     Time User Action Codes Description Comment    2/6/2021 10:43 AM Mackenzie Gonzalez Add [K11 20] Sialoadenitis of submandibular gland     2/6/2021 10:43 AM Mackenzie Gonzalez Add [K11 5] Sialolithiasis of submandibular gland ED Disposition     ED Disposition Condition Date/Time Comment    Discharge Stable Sat Feb 6, 2021 10:45 AM Kendra Cleveland discharge to home/self care  Follow-up Information     Follow up With Specialties Details Why Contact Info Additional [address] Otolaryngology In 2 days  [address]  [address], [address], [address], [address]          Discharge Medication List as of 2/6/2021 10:45 AM      START taking these medications    Details   clindamycin (CLEOCIN) 300 MG capsule Take 1 capsule (300 mg total) by mouth 4 (four) times a day for 7 days, Starting Sat 2/6/2021, Until Sat 2/13/2021, Normal         CONTINUE these medications which have NOT CHANGED    Details   ALPRAZolam (XANAX) 0 5 mg tablet Take 1 tablet (0 5 mg total) by mouth 3 (three) times a day as needed for anxiety for up to 10 days, Starting Tue 11/26/2019, Until Tue 7/28/2020, No Print      buPROPion HCl (WELLBUTRIN PO) Take by mouth, Historical Med      cholecalciferol (VITAMIN D3) 1,000 units tablet Take 1 tablet (1,000 Units total) by mouth daily, Starting Wed 11/27/2019, Print      escitalopram (LEXAPRO) 10 mg tablet Take 1 tablet (10 mg total) by mouth daily, Starting Wed 11/27/2019, Normal      levothyroxine 125 mcg tablet Take 1 tablet (125 mcg total) by mouth daily in the early morning, Starting Wed 11/27/2019, Print      lisinopril (ZESTRIL) 20 mg tablet Take 1 tablet (20 mg total) by mouth daily at bedtime, Starting Tue 11/26/2019, Print      senna-docusate sodium (SENOKOT S) 8 6-50 mg per tablet Take 1 tablet by mouth daily at bedtime, Starting Tue 11/26/2019, Print           No discharge procedures on file      PDMP Review     None          ED Provider  Electronically Signed by           Vinay Ward ,   02/06/21 1779

## 2024-12-19 NOTE — SOCIAL WORK
CM met with PT  Review TX and progress thus far  PT complains of being tired and associating this with medications and withdrawal  Reviewed PT concerns about wanting to be home with the fairly  Reviewed that PT has court on Monday, CM agreed to send letter in support of her request for continuance  CM and PT discussed need for ongoing treatment  PT was uncertain  Reviewed pros and cons to staying   After much discussion and expression PT agreed to rescind 72 hour notice with understanding that as long as she continues to progress we could D/C on Wednesday  PT rescinded 72 hour notice and signed  PT denies SI/HI/AH/VH, was tearful throughout conversation, and shows slight improvement in acknowledging substance use, for example, PT stated " I only use once a day" CM reflected "so daily use"  PT reflected support of family and being close knit and her desire to get better to be there for them  Patient: Katie Reno    Procedure: Procedure(s):  LAPAROTOMY WITH EXCISION OF LEFT CORNUAL PREGNANCY       Anesthesia Type:  General    Note:     Postop Pain Control: Uneventful            Sign Out: Well controlled pain   PONV: No   Neuro/Psych: Uneventful            Sign Out: Acceptable/Baseline neuro status   Airway/Respiratory: Uneventful            Sign Out: Acceptable/Baseline resp. status   CV/Hemodynamics: Uneventful            Sign Out: Acceptable CV status; No obvious hypovolemia; No obvious fluid overload   Other NRE: NONE   DID A NON-ROUTINE EVENT OCCUR? No           Last vitals:  Vitals Value Taken Time   /60 12/19/24 0130   Temp 36.3  C (97.4  F) 12/19/24 0043   Pulse 60 12/19/24 0132   Resp 15 12/19/24 0132   SpO2 99 % 12/19/24 0132   Vitals shown include unfiled device data.    Electronically Signed By: Flakito Weldon MD  December 19, 2024  6:41 AM

## 2025-04-02 ENCOUNTER — APPOINTMENT (EMERGENCY)
Dept: CT IMAGING | Facility: HOSPITAL | Age: 55
End: 2025-04-02
Payer: COMMERCIAL

## 2025-04-02 ENCOUNTER — APPOINTMENT (EMERGENCY)
Dept: RADIOLOGY | Facility: HOSPITAL | Age: 55
End: 2025-04-02
Payer: COMMERCIAL

## 2025-04-02 ENCOUNTER — HOSPITAL ENCOUNTER (EMERGENCY)
Facility: HOSPITAL | Age: 55
Discharge: HOME | End: 2025-04-02
Attending: EMERGENCY MEDICINE
Payer: COMMERCIAL

## 2025-04-02 VITALS
HEIGHT: 63 IN | WEIGHT: 183 LBS | RESPIRATION RATE: 18 BRPM | SYSTOLIC BLOOD PRESSURE: 157 MMHG | TEMPERATURE: 98.4 F | BODY MASS INDEX: 32.43 KG/M2 | OXYGEN SATURATION: 98 % | HEART RATE: 76 BPM | DIASTOLIC BLOOD PRESSURE: 96 MMHG

## 2025-04-02 DIAGNOSIS — M54.12 CERVICAL RADICULOPATHY: Primary | ICD-10-CM

## 2025-04-02 DIAGNOSIS — R41.89 BRAIN FOG: ICD-10-CM

## 2025-04-02 LAB
ANION GAP SERPL CALCULATED.3IONS-SCNC: 9 MMOL/L (ref 4–13)
BASOPHILS # BLD AUTO: 0.05 THOUSANDS/ÂΜL (ref 0–0.1)
BASOPHILS NFR BLD AUTO: 1 % (ref 0–1)
BILIRUB UR QL STRIP: NEGATIVE
BUN SERPL-MCNC: 20 MG/DL (ref 5–25)
CALCIUM SERPL-MCNC: 9.3 MG/DL (ref 8.4–10.2)
CARDIAC TROPONIN I PNL SERPL HS: <2 NG/L (ref ?–50)
CHLORIDE SERPL-SCNC: 104 MMOL/L (ref 96–108)
CLARITY UR: CLEAR
CO2 SERPL-SCNC: 25 MMOL/L (ref 21–32)
COLOR UR: ABNORMAL
CREAT SERPL-MCNC: 0.83 MG/DL (ref 0.6–1.3)
EOSINOPHIL # BLD AUTO: 0.1 THOUSAND/ÂΜL (ref 0–0.61)
EOSINOPHIL NFR BLD AUTO: 1 % (ref 0–6)
ERYTHROCYTE [DISTWIDTH] IN BLOOD BY AUTOMATED COUNT: 11.7 % (ref 11.6–15.1)
GFR SERPL CREATININE-BSD FRML MDRD: 79 ML/MIN/1.73SQ M
GLUCOSE SERPL-MCNC: 81 MG/DL (ref 65–140)
GLUCOSE UR STRIP-MCNC: NEGATIVE MG/DL
HCT VFR BLD AUTO: 43.4 % (ref 34.8–46.1)
HGB BLD-MCNC: 14.8 G/DL (ref 11.5–15.4)
HGB UR QL STRIP.AUTO: NEGATIVE
IMM GRANULOCYTES # BLD AUTO: 0.03 THOUSAND/UL (ref 0–0.2)
IMM GRANULOCYTES NFR BLD AUTO: 0 % (ref 0–2)
KETONES UR STRIP-MCNC: NEGATIVE MG/DL
LEUKOCYTE ESTERASE UR QL STRIP: NEGATIVE
LYMPHOCYTES # BLD AUTO: 2.66 THOUSANDS/ÂΜL (ref 0.6–4.47)
LYMPHOCYTES NFR BLD AUTO: 30 % (ref 14–44)
MCH RBC QN AUTO: 32.2 PG (ref 26.8–34.3)
MCHC RBC AUTO-ENTMCNC: 34.1 G/DL (ref 31.4–37.4)
MCV RBC AUTO: 94 FL (ref 82–98)
MONOCYTES # BLD AUTO: 0.39 THOUSAND/ÂΜL (ref 0.17–1.22)
MONOCYTES NFR BLD AUTO: 4 % (ref 4–12)
NEUTROPHILS # BLD AUTO: 5.64 THOUSANDS/ÂΜL (ref 1.85–7.62)
NEUTS SEG NFR BLD AUTO: 64 % (ref 43–75)
NITRITE UR QL STRIP: NEGATIVE
NRBC BLD AUTO-RTO: 0 /100 WBCS
PH UR STRIP.AUTO: 6 [PH]
PLATELET # BLD AUTO: 290 THOUSANDS/UL (ref 149–390)
PMV BLD AUTO: 9.3 FL (ref 8.9–12.7)
POTASSIUM SERPL-SCNC: 3.9 MMOL/L (ref 3.5–5.3)
PROT UR STRIP-MCNC: NEGATIVE MG/DL
RBC # BLD AUTO: 4.6 MILLION/UL (ref 3.81–5.12)
SODIUM SERPL-SCNC: 138 MMOL/L (ref 135–147)
SP GR UR STRIP.AUTO: <=1.005
UROBILINOGEN UR QL STRIP.AUTO: 0.2 E.U./DL
WBC # BLD AUTO: 8.87 THOUSAND/UL (ref 4.31–10.16)

## 2025-04-02 PROCEDURE — 99284 EMERGENCY DEPT VISIT MOD MDM: CPT | Performed by: EMERGENCY MEDICINE

## 2025-04-02 PROCEDURE — 81003 URINALYSIS AUTO W/O SCOPE: CPT | Performed by: EMERGENCY MEDICINE

## 2025-04-02 PROCEDURE — 36415 COLL VENOUS BLD VENIPUNCTURE: CPT | Performed by: EMERGENCY MEDICINE

## 2025-04-02 PROCEDURE — 96374 THER/PROPH/DIAG INJ IV PUSH: CPT

## 2025-04-02 PROCEDURE — 85025 COMPLETE CBC W/AUTO DIFF WBC: CPT | Performed by: EMERGENCY MEDICINE

## 2025-04-02 PROCEDURE — 70450 CT HEAD/BRAIN W/O DYE: CPT

## 2025-04-02 PROCEDURE — 73030 X-RAY EXAM OF SHOULDER: CPT

## 2025-04-02 PROCEDURE — 84484 ASSAY OF TROPONIN QUANT: CPT | Performed by: EMERGENCY MEDICINE

## 2025-04-02 PROCEDURE — 71045 X-RAY EXAM CHEST 1 VIEW: CPT

## 2025-04-02 PROCEDURE — 99284 EMERGENCY DEPT VISIT MOD MDM: CPT

## 2025-04-02 PROCEDURE — 80048 BASIC METABOLIC PNL TOTAL CA: CPT | Performed by: EMERGENCY MEDICINE

## 2025-04-02 RX ORDER — KETOROLAC TROMETHAMINE 30 MG/ML
15 INJECTION, SOLUTION INTRAMUSCULAR; INTRAVENOUS ONCE
Status: COMPLETED | OUTPATIENT
Start: 2025-04-02 | End: 2025-04-02

## 2025-04-02 RX ORDER — IBUPROFEN 800 MG/1
800 TABLET, FILM COATED ORAL EVERY 8 HOURS PRN
Qty: 21 TABLET | Refills: 0 | Status: SHIPPED | OUTPATIENT
Start: 2025-04-02

## 2025-04-02 RX ORDER — SODIUM CHLORIDE 9 MG/ML
3 INJECTION INTRAVENOUS
Status: DISCONTINUED | OUTPATIENT
Start: 2025-04-02 | End: 2025-04-02 | Stop reason: HOSPADM

## 2025-04-02 RX ADMIN — KETOROLAC TROMETHAMINE 15 MG: 30 INJECTION, SOLUTION INTRAMUSCULAR at 18:34

## 2025-04-02 NOTE — ED TRIAGE NOTES
"Ambulatory w/complaint of right shoulder pain which  radiates down right arm which started at 0300 today; states \"I've had brain fog for days and I can't see as well for days\"; states chest pain which also started days ago; denies shortness of breath  "

## 2025-04-03 NOTE — ED PROVIDER NOTES
Time reflects when diagnosis was documented in both MDM as applicable and the Disposition within this note       Time User Action Codes Description Comment    4/2/2025  7:13 PM Dewayne Martinez Add [M54.12] Cervical radiculopathy     4/2/2025  7:13 PM Dewayne Martinez Add [R41.89] Brain fog           ED Disposition       ED Disposition   Discharge    Condition   Stable    Date/Time   Wed Apr 2, 2025  7:13 PM    Comment   Pat Alvaradosada discharge to home/self care.                   Assessment & Plan       Medical Decision Making  Patient is a 54-year-old female who presents for evaluation of right-sided arm pain.  Seems to be radiculopathy based on her paresthesias in the fourth and fifth finger.  Cardiac workup including troponin was unremarkable and EKG is nonischemic.  Patient CT head negative.  Visual acuity is normal here, will advise follow-up to PCP for brain fog.    Amount and/or Complexity of Data Reviewed  Labs: ordered.  Radiology: ordered.    Risk  Prescription drug management.             Medications   ketorolac (TORADOL) injection 15 mg (15 mg Intravenous Given 4/2/25 1834)       ED Risk Strat Scores                            SBIRT 22yo+      Flowsheet Row Most Recent Value   Initial Alcohol Screen: US AUDIT-C     1. How often do you have a drink containing alcohol? 0 Filed at: 04/02/2025 1727   2. How many drinks containing alcohol do you have on a typical day you are drinking?  0 Filed at: 04/02/2025 1727   3b. FEMALE Any Age, or MALE 65+: How often do you have 4 or more drinks on one occassion? 0 Filed at: 04/02/2025 1727   Audit-C Score 0 Filed at: 04/02/2025 1727   CARLOS: How many times in the past year have you...    Used an illegal drug or used a prescription medication for non-medical reasons? Never Filed at: 04/02/2025 1727                            History of Present Illness       Chief Complaint   Patient presents with    Shoulder Pain     Ambulatory w/complaint of right shoulder pain  "which  radiates down right arm which started at 0300 today; states \"I've had brain fog for days and I can't see as well for days\"; states chest pain which also started days ago; denies shortness of breath       Past Medical History:   Diagnosis Date    Anxiety     Depression     Hypertension     Hypothyroid       Past Surgical History:   Procedure Laterality Date     SECTION        History reviewed. No pertinent family history.   Social History     Tobacco Use    Smoking status: Former     Types: E-Cigarettes    Smokeless tobacco: Never   Vaping Use    Vaping status: Some Days    Substances: Nicotine, THC   Substance Use Topics    Alcohol use: Yes     Comment: social    Drug use: Not Currently     Types: Methamphetamines, Marijuana      E-Cigarette/Vaping    E-Cigarette Use Current Some Day User       E-Cigarette/Vaping Substances    Nicotine Yes     THC Yes     CBD No     Flavoring No     Other No     Unknown No       I have reviewed and agree with the history as documented.     Patient is a 54-year-old female that presents for evaluation of right-sided arm pain.  Patient says that starting early this morning she began to have some right shoulder pain that radiates into her right arm.  Pain is somewhat constant in nature.  She endorses some paresthesias in her right fourth and fifth finger as well.  Patient has also had some \"brain fog\" over the past couple days and difficulty concentrating.  She feels like her vision is slightly blurry in both eyes but no floaters noted.  Otherwise she denies focal neurologic deficit.  No significant headache, head trauma, nausea, vomiting, recent fevers.  No neck pain chest pain dyspnea or abdominal pain.        Review of Systems   Constitutional:  Negative for fever.   HENT:  Negative for sore throat.    Respiratory:  Negative for shortness of breath.    Cardiovascular:  Negative for chest pain.   Gastrointestinal:  Negative for abdominal pain.   Genitourinary:  Negative " for dysuria.   Musculoskeletal:  Negative for back pain.        R arm pain      Skin:  Negative for rash.   Neurological:  Negative for light-headedness.   Psychiatric/Behavioral:  Negative for agitation.    All other systems reviewed and are negative.          Objective       ED Triage Vitals [04/02/25 1725]   Temperature Pulse Blood Pressure Respirations SpO2 Patient Position - Orthostatic VS   98.4 °F (36.9 °C) 76 157/96 18 98 % Sitting      Temp Source Heart Rate Source BP Location FiO2 (%) Pain Score    Temporal Monitor Left arm -- 3      Vitals      Date and Time Temp Pulse SpO2 Resp BP Pain Score FACES Pain Rating User   04/02/25 1725 98.4 °F (36.9 °C) 76 98 % 18 157/96 3 -- TB            Physical Exam  Vitals reviewed.   Constitutional:       General: She is not in acute distress.     Appearance: She is well-developed.   HENT:      Head: Normocephalic.   Eyes:      Pupils: Pupils are equal, round, and reactive to light.   Cardiovascular:      Rate and Rhythm: Normal rate and regular rhythm.      Heart sounds: Normal heart sounds.   Pulmonary:      Effort: Pulmonary effort is normal.      Breath sounds: Normal breath sounds.   Abdominal:      General: Bowel sounds are normal. There is no distension.      Palpations: Abdomen is soft.      Tenderness: There is no abdominal tenderness. There is no guarding.   Musculoskeletal:         General: No tenderness or deformity. Normal range of motion.      Cervical back: Normal range of motion and neck supple.      Comments: Mild tenderness of right shoulder.  Full range of motion.  Distally neurovascular intact in median/ulnar/radial nerve distribution intact motor and sensory.  Paresthesias noted over the fourth and fifth finger   Skin:     General: Skin is warm and dry.      Capillary Refill: Capillary refill takes less than 2 seconds.   Neurological:      Mental Status: She is alert and oriented to person, place, and time.      Cranial Nerves: No cranial nerve  deficit.      Sensory: No sensory deficit.   Psychiatric:         Behavior: Behavior normal.         Thought Content: Thought content normal.         Judgment: Judgment normal.         Results Reviewed       Procedure Component Value Units Date/Time    HS Troponin 0hr (reflex protocol) [[id number]]  (Normal) Collected: 04/02/25 1831    Lab Status: Final result Specimen: Blood from Arm, Left Updated: 04/02/25 1905     hs TnI 0hr <2 ng/L     Basic metabolic panel [[id number]] Collected: 04/02/25 1831    Lab Status: Final result Specimen: Blood from Arm, Left Updated: 04/02/25 1858     Sodium 138 mmol/L      Potassium 3.9 mmol/L      Chloride 104 mmol/L      CO2 25 mmol/L      ANION GAP 9 mmol/L      BUN 20 mg/dL      Creatinine 0.83 mg/dL      Glucose 81 mg/dL      Calcium 9.3 mg/dL      eGFR 79 ml/min/1.73sq m     Narrative:      [employer] guidelines for Chronic Kidney Disease (CKD):     Stage 1 with normal or high GFR (GFR > 90 mL/min/1.73 square meters)    Stage 2 Mild CKD (GFR = 60-89 mL/min/1.73 square meters)    Stage 3A Moderate CKD (GFR = 45-59 mL/min/1.73 square meters)    Stage 3B Moderate CKD (GFR = 30-44 mL/min/1.73 square meters)    Stage 4 Severe CKD (GFR = 15-29 mL/min/1.73 square meters)    Stage 5 End Stage CKD (GFR <15 mL/min/1.73 square meters)  Note: GFR calculation is accurate only with a steady state creatinine    UA w Reflex to Microscopic w Reflex to Culture [[id number]]  (Abnormal) Collected: 04/02/25 1833    Lab Status: Final result Specimen: Urine, Clean Catch Updated: 04/02/25 1845     Color, UA Straw     Clarity, UA Clear     Specific Gravity, UA <=1.005     pH, UA 6.0     Leukocytes, UA Negative     Nitrite, UA Negative     Protein, UA Negative mg/dl      Glucose, UA Negative mg/dl      Ketones, UA Negative mg/dl      Urobilinogen, UA 0.2 E.U./dl      Bilirubin, UA Negative     Occult Blood, UA Negative    CBC and differential [[id number]] Collected: 04/02/25 1831     Lab Status: Final result Specimen: Blood from Arm, Left Updated: 04/02/25 1843     WBC 8.87 Thousand/uL      RBC 4.60 Million/uL      Hemoglobin 14.8 g/dL      Hematocrit 43.4 %      MCV 94 fL      MCH 32.2 pg      MCHC 34.1 g/dL      RDW 11.7 %      MPV 9.3 fL      Platelets 290 Thousands/uL      nRBC 0 /100 WBCs      Segmented % 64 %      Immature Grans % 0 %      Lymphocytes % 30 %      Monocytes % 4 %      Eosinophils Relative 1 %      Basophils Relative 1 %      Absolute Neutrophils 5.64 Thousands/µL      Absolute Immature Grans 0.03 Thousand/uL      Absolute Lymphocytes 2.66 Thousands/µL      Absolute Monocytes 0.39 Thousand/µL      Eosinophils Absolute 0.10 Thousand/µL      Basophils Absolute 0.05 Thousands/µL             X-ray chest 1 view portable   Final Interpretation by Santi Crow MD (04/03 0558)      No radiographic evidence of acute intrathoracic process.            Workstation performed: [id number]         XR shoulder 2+ views RIGHT   Final Interpretation by Santi Crow MD (04/03 0559)      No acute osseous abnormality.      Rotator cuff calcific tendinitis.         Computerized Assisted Algorithm (CAA) may have been used to analyze all applicable images.         Workstation performed: [id number]         CT head without contrast   Final Interpretation by Enrique Pierce MD (04/02 1836)      No acute intracranial abnormality.                  Workstation performed: [id number]             ECG 12 Lead Documentation Only    Date/Time: 4/3/2025 4:48 PM    Performed by: Dewayne Martinez MD  Authorized by: Dewayne Martinez MD    ECG reviewed by me, the ED Provider: yes    Patient location:  ED  Previous ECG:     Previous ECG:  Unavailable    Comparison to cardiac monitor: Yes    Interpretation:     Interpretation: normal    Rate:     ECG rate assessment: normal    Rhythm:     Rhythm: sinus rhythm    Ectopy:     Ectopy: none    QRS:     QRS axis:  Normal    QRS intervals:   Normal  Conduction:     Conduction: normal    ST segments:     ST segments:  Normal  T waves:     T waves: normal        ED Medication and Procedure Management   Prior to Admission Medications   Prescriptions Last Dose Informant Patient Reported? Taking?   ALPRAZolam (XANAX) 0.5 mg tablet  Self No No   Sig: Take 1 tablet (0.5 mg total) by mouth 3 (three) times a day as needed for anxiety for up to 10 days   buPROPion HCl (WELLBUTRIN PO)  Self Yes No   Sig: Take by mouth   cholecalciferol (VITAMIN D3) 1,000 units tablet  Self No No   Sig: Take 1 tablet (1,000 Units total) by mouth daily   escitalopram (LEXAPRO) 10 mg tablet  Self No No   Sig: Take 1 tablet (10 mg total) by mouth daily   Patient not taking: Reported on 7/28/2020   levothyroxine 125 mcg tablet  Self No No   Sig: Take 1 tablet (125 mcg total) by mouth daily in the early morning   Patient taking differently: Take 175 mcg by mouth daily in the early morning    lisinopril (ZESTRIL) 20 mg tablet  Self No No   Sig: Take 1 tablet (20 mg total) by mouth daily at bedtime   senna-docusate sodium (SENOKOT S) 8.6-50 mg per tablet  Self No No   Sig: Take 1 tablet by mouth daily at bedtime   Patient not taking: Reported on 7/28/2020      Facility-Administered Medications: None     Discharge Medication List as of 4/2/2025  7:14 PM        START taking these medications    Details   ibuprofen (MOTRIN) 800 mg tablet Take 1 tablet (800 mg total) by mouth every 8 (eight) hours as needed for mild pain, Starting Wed 4/2/2025, Normal           CONTINUE these medications which have NOT CHANGED    Details   ALPRAZolam (XANAX) 0.5 mg tablet Take 1 tablet (0.5 mg total) by mouth 3 (three) times a day as needed for anxiety for up to 10 days, Starting Tue 11/26/2019, Until Mon 2/15/2021, No Print      buPROPion HCl (WELLBUTRIN PO) Take by mouth, Historical Med      cholecalciferol (VITAMIN D3) 1,000 units tablet Take 1 tablet (1,000 Units total) by mouth daily, Starting Wed  11/27/2019, Print      escitalopram (LEXAPRO) 10 mg tablet Take 1 tablet (10 mg total) by mouth daily, Starting Wed 11/27/2019, Normal      levothyroxine 125 mcg tablet Take 1 tablet (125 mcg total) by mouth daily in the early morning, Starting Wed 11/27/2019, Print      lisinopril (ZESTRIL) 20 mg tablet Take 1 tablet (20 mg total) by mouth daily at bedtime, Starting Tue 11/26/2019, Print      senna-docusate sodium (SENOKOT S) 8.6-50 mg per tablet Take 1 tablet by mouth daily at bedtime, Starting Tue 11/26/2019, Print           No discharge procedures on file.  ED SEPSIS DOCUMENTATION   Time reflects when diagnosis was documented in both MDM as applicable and the Disposition within this note       Time User Action Codes Description Comment    4/2/2025  7:13 PM Dewayne Martinez [M54.12] Cervical radiculopathy     4/2/2025  7:13 PM Dewayne Martinez [R41.89] Brain fog                  Dewayne Martinez MD  04/03/25 2782

## 2025-04-05 ENCOUNTER — APPOINTMENT (EMERGENCY)
Dept: MRI IMAGING | Facility: HOSPITAL | Age: 55
End: 2025-04-05

## 2025-04-05 ENCOUNTER — HOSPITAL ENCOUNTER (EMERGENCY)
Facility: HOSPITAL | Age: 55
Discharge: HOME | End: 2025-04-05
Attending: EMERGENCY MEDICINE

## 2025-04-05 VITALS
HEART RATE: 70 BPM | RESPIRATION RATE: 18 BRPM | BODY MASS INDEX: 32.43 KG/M2 | HEIGHT: 63 IN | WEIGHT: 183 LBS | DIASTOLIC BLOOD PRESSURE: 78 MMHG | TEMPERATURE: 97.9 F | SYSTOLIC BLOOD PRESSURE: 125 MMHG | OXYGEN SATURATION: 96 %

## 2025-04-05 DIAGNOSIS — M25.519 SHOULDER PAIN: Primary | ICD-10-CM

## 2025-04-05 LAB
ANION GAP SERPL CALCULATED.3IONS-SCNC: 7 MMOL/L (ref 4–13)
BASOPHILS # BLD AUTO: 0.04 THOUSANDS/ÂΜL (ref 0–0.1)
BASOPHILS NFR BLD AUTO: 0 % (ref 0–1)
BUN SERPL-MCNC: 16 MG/DL (ref 5–25)
CALCIUM SERPL-MCNC: 9.4 MG/DL (ref 8.4–10.2)
CHLORIDE SERPL-SCNC: 106 MMOL/L (ref 96–108)
CO2 SERPL-SCNC: 25 MMOL/L (ref 21–32)
CREAT SERPL-MCNC: 0.72 MG/DL (ref 0.6–1.3)
EOSINOPHIL # BLD AUTO: 0.05 THOUSAND/ÂΜL (ref 0–0.61)
EOSINOPHIL NFR BLD AUTO: 1 % (ref 0–6)
ERYTHROCYTE [DISTWIDTH] IN BLOOD BY AUTOMATED COUNT: 11.8 % (ref 11.6–15.1)
GFR SERPL CREATININE-BSD FRML MDRD: 94 ML/MIN/1.73SQ M
GLUCOSE SERPL-MCNC: 88 MG/DL (ref 65–140)
HCT VFR BLD AUTO: 42.3 % (ref 34.8–46.1)
HGB BLD-MCNC: 14.2 G/DL (ref 11.5–15.4)
IMM GRANULOCYTES # BLD AUTO: 0.07 THOUSAND/UL (ref 0–0.2)
IMM GRANULOCYTES NFR BLD AUTO: 1 % (ref 0–2)
LYMPHOCYTES # BLD AUTO: 2.02 THOUSANDS/ÂΜL (ref 0.6–4.47)
LYMPHOCYTES NFR BLD AUTO: 19 % (ref 14–44)
MCH RBC QN AUTO: 31.8 PG (ref 26.8–34.3)
MCHC RBC AUTO-ENTMCNC: 33.6 G/DL (ref 31.4–37.4)
MCV RBC AUTO: 95 FL (ref 82–98)
MONOCYTES # BLD AUTO: 0.53 THOUSAND/ÂΜL (ref 0.17–1.22)
MONOCYTES NFR BLD AUTO: 5 % (ref 4–12)
NEUTROPHILS # BLD AUTO: 8.12 THOUSANDS/ÂΜL (ref 1.85–7.62)
NEUTS SEG NFR BLD AUTO: 74 % (ref 43–75)
NRBC BLD AUTO-RTO: 0 /100 WBCS
PLATELET # BLD AUTO: 277 THOUSANDS/UL (ref 149–390)
PMV BLD AUTO: 9.1 FL (ref 8.9–12.7)
POTASSIUM SERPL-SCNC: 4.2 MMOL/L (ref 3.5–5.3)
RBC # BLD AUTO: 4.46 MILLION/UL (ref 3.81–5.12)
SODIUM SERPL-SCNC: 138 MMOL/L (ref 135–147)
WBC # BLD AUTO: 10.83 THOUSAND/UL (ref 4.31–10.16)

## 2025-04-05 PROCEDURE — 96375 TX/PRO/DX INJ NEW DRUG ADDON: CPT

## 2025-04-05 PROCEDURE — 80048 BASIC METABOLIC PNL TOTAL CA: CPT | Performed by: EMERGENCY MEDICINE

## 2025-04-05 PROCEDURE — 36415 COLL VENOUS BLD VENIPUNCTURE: CPT | Performed by: EMERGENCY MEDICINE

## 2025-04-05 PROCEDURE — 96374 THER/PROPH/DIAG INJ IV PUSH: CPT

## 2025-04-05 PROCEDURE — 72141 MRI NECK SPINE W/O DYE: CPT

## 2025-04-05 PROCEDURE — 99283 EMERGENCY DEPT VISIT LOW MDM: CPT

## 2025-04-05 PROCEDURE — 99285 EMERGENCY DEPT VISIT HI MDM: CPT | Performed by: EMERGENCY MEDICINE

## 2025-04-05 PROCEDURE — 85025 COMPLETE CBC W/AUTO DIFF WBC: CPT | Performed by: EMERGENCY MEDICINE

## 2025-04-05 RX ORDER — KETOROLAC TROMETHAMINE 30 MG/ML
15 INJECTION, SOLUTION INTRAMUSCULAR; INTRAVENOUS ONCE
Status: COMPLETED | OUTPATIENT
Start: 2025-04-05 | End: 2025-04-05

## 2025-04-05 RX ORDER — HYDROMORPHONE HCL/PF 1 MG/ML
1 SYRINGE (ML) INJECTION ONCE
Refills: 0 | Status: COMPLETED | OUTPATIENT
Start: 2025-04-05 | End: 2025-04-05

## 2025-04-05 RX ORDER — OXYCODONE HYDROCHLORIDE 5 MG/1
5 TABLET ORAL EVERY 6 HOURS PRN
Qty: 8 TABLET | Refills: 0 | Status: SHIPPED | OUTPATIENT
Start: 2025-04-05 | End: 2025-04-15

## 2025-04-05 RX ORDER — OXYCODONE HYDROCHLORIDE 5 MG/1
5 TABLET ORAL ONCE
Refills: 0 | Status: COMPLETED | OUTPATIENT
Start: 2025-04-05 | End: 2025-04-05

## 2025-04-05 RX ADMIN — KETOROLAC TROMETHAMINE 15 MG: 30 INJECTION, SOLUTION INTRAMUSCULAR at 15:37

## 2025-04-05 RX ADMIN — OXYCODONE HYDROCHLORIDE 5 MG: 5 TABLET ORAL at 15:37

## 2025-04-05 RX ADMIN — HYDROMORPHONE HYDROCHLORIDE 1 MG: 1 INJECTION, SOLUTION INTRAMUSCULAR; INTRAVENOUS; SUBCUTANEOUS at 12:32

## 2025-04-05 NOTE — ED NOTES
Pt unable to remove nose ring; MRI tech made aware.    This RN making attempt with no success.      Aziza Phillips, RN  04/05/25 8648

## 2025-04-06 NOTE — ED PROVIDER NOTES
Time reflects when diagnosis was documented in both MDM as applicable and the Disposition within this note       Time User Action Codes Description Comment    4/5/2025  4:38 PM Dewayne Martinez Add [M25.519] Shoulder pain           ED Disposition       ED Disposition   Discharge    Condition   Stable    Date/Time   Sat Apr 5, 2025  4:38 PM    Comment   Pat Dailey discharge to home/self care.                   Assessment & Plan       Medical Decision Making  Patient is a 54-year-old female who presents for evaluation of right shoulder pain.  Primary concern for either cervical radiculopathy/cord impingement versus rotator cuff injury.  Based on her tenderness and significant pain, primary concern for muscular injury.  However, based on her severe weakness secondary to the pain cervical MRI was obtained that was negative.  Patient previously had a cardiac workup which was negative.  Primary concern for musculoskeletal right shoulder pain such as rotator cuff tear.  Previous tendinitis seen on x-ray done 3 days ago.  Patient was advised to continue to take ibuprofen and I prescribed her some oxycodone as well.  Advised orthopedic follow-up and strict return precautions.    Amount and/or Complexity of Data Reviewed  Labs: ordered.  Radiology: ordered.    Risk  Prescription drug management.        ED Course as of 04/05/25 2121   Sat Apr 05, 2025   1241 [place]       Medications   HYDROmorphone (DILAUDID) injection 1 mg (1 mg Intravenous Given 4/5/25 1232)   oxyCODONE (ROXICODONE) IR tablet 5 mg (5 mg Oral Given 4/5/25 1537)   ketorolac (TORADOL) injection 15 mg (15 mg Intravenous Given 4/5/25 1537)       ED Risk Strat Scores                            SBIRT 20yo+      Flowsheet Row Most Recent Value   Initial Alcohol Screen: US AUDIT-C     1. How often do you have a drink containing alcohol? 0 Filed at: 04/05/2025 1220   2. How many drinks containing alcohol do you have on a typical day you are drinking?  0  Filed at: 2025 1220   3a. Male UNDER 65: How often do you have five or more drinks on one occasion? 0 Filed at: 2025 1220   3b. FEMALE Any Age, or MALE 65+: How often do you have 4 or more drinks on one occassion? 0 Filed at: 2025 1220   Audit-C Score 0 Filed at: 2025 1220   CARLOS: How many times in the past year have you...    Used an illegal drug or used a prescription medication for non-medical reasons? Never Filed at: 2025 1220                            History of Present Illness       Chief Complaint   Patient presents with    Shoulder Pain     Right sided shoulder pain        Past Medical History:   Diagnosis Date    Anxiety     Depression     Hypertension     Hypothyroid       Past Surgical History:   Procedure Laterality Date     SECTION        History reviewed. No pertinent family history.   Social History     Tobacco Use    Smoking status: Former     Types: E-Cigarettes    Smokeless tobacco: Never   Vaping Use    Vaping status: Some Days    Substances: Nicotine, THC   Substance Use Topics    Alcohol use: Yes     Comment: social    Drug use: Not Currently     Types: Methamphetamines, Marijuana      E-Cigarette/Vaping    E-Cigarette Use Current Some Day User       E-Cigarette/Vaping Substances    Nicotine Yes     THC Yes     CBD No     Flavoring No     Other No     Unknown No       I have reviewed and agree with the history as documented.     Patient is a 54-year-old female that presents for reevaluation of right shoulder pain.  Patient says that she has been having sniffily worsening pain since I evaluated her 3 days ago.  At that time I suspect that she may have a cervical radiculopathy as she complained of some right shoulder pain radiating down into her right fourth and fifth fingers.  She says that the pain no longer radiates into her hand but is primary from his right shoulder blade into her right upper arm.  The pain is severe and worsened by any motion of the  right arm.  Patient denies any preceding trauma.  There is no associated chest pain dyspnea or vomiting.  She has been taking the prescribed ibuprofen 8 or milligrams and I gave her previously which does help for a couple hours but the pain does tend to come back after 3 or 4 hours.  No weakness numbness or paresthesias in the right hand.        Review of Systems   Constitutional:  Negative for fever.   HENT:  Negative for sore throat.    Respiratory:  Negative for shortness of breath.    Cardiovascular:  Negative for chest pain.   Gastrointestinal:  Negative for abdominal pain.   Genitourinary:  Negative for dysuria.   Musculoskeletal:  Negative for back pain.        Right shoulder pain   Skin:  Negative for rash.   Neurological:  Negative for light-headedness.   Psychiatric/Behavioral:  Negative for agitation.    All other systems reviewed and are negative.          Objective       ED Triage Vitals [04/05/25 1219]   Temperature Pulse Blood Pressure Respirations SpO2 Patient Position - Orthostatic VS   98.2 °F (36.8 °C) 77 (!) 171/93 18 96 % Sitting      Temp Source Heart Rate Source BP Location FiO2 (%) Pain Score    Temporal Monitor Left arm -- 10 - Worst Possible Pain      Vitals      Date and Time Temp Pulse SpO2 Resp BP Pain Score FACES Pain Rating User   04/05/25 1642 97.9 °F (36.6 °C) 70 96 % 18 125/78 2 -- TAB   04/05/25 1600 -- 68 95 % 20 132/82 -- -- TAB   04/05/25 1537 -- -- -- -- -- 7 -- TAB   04/05/25 1530 -- 66 97 % 20 145/91 -- -- TAB   04/05/25 1523 -- 68 96 % 18 155/92 -- -- TAB   04/05/25 1430 -- 66 94 % 18 135/85 -- -- TAB   04/05/25 1330 -- 72 96 % 18 145/76 -- -- TAB   04/05/25 1234 -- 72 97 % 16 157/83 -- -- TAB   04/05/25 1232 -- -- -- -- -- 10 - Worst Possible Pain -- TAB   04/05/25 1219 98.2 °F (36.8 °C) 77 96 % 18 171/93 10 - Worst Possible Pain -- DK            Physical Exam  Vitals reviewed.   Constitutional:       General: She is not in acute distress.     Appearance: She is  well-developed.   HENT:      Head: Normocephalic.   Eyes:      Pupils: Pupils are equal, round, and reactive to light.   Cardiovascular:      Rate and Rhythm: Normal rate and regular rhythm.      Heart sounds: Normal heart sounds.   Pulmonary:      Effort: Pulmonary effort is normal.      Breath sounds: Normal breath sounds.   Abdominal:      General: Bowel sounds are normal. There is no distension.      Palpations: Abdomen is soft.      Tenderness: There is no abdominal tenderness. There is no guarding.   Musculoskeletal:         General: Tenderness present. No deformity. Normal range of motion.      Cervical back: Normal range of motion and neck supple.      Comments: Moderate diffuse tenderness of the right shoulder.  Decreased range of motion secondary to pain.  2+ radial pulse.  Median/ulnar/radial nerve distribution intact motor and sensory   Skin:     General: Skin is warm and dry.      Capillary Refill: Capillary refill takes less than 2 seconds.   Neurological:      Mental Status: She is alert and oriented to person, place, and time.      Cranial Nerves: No cranial nerve deficit.      Sensory: No sensory deficit.   Psychiatric:         Behavior: Behavior normal.         Thought Content: Thought content normal.         Judgment: Judgment normal.         Results Reviewed       Procedure Component Value Units Date/Time    Basic metabolic panel [[id number]] Collected: 04/05/25 1231    Lab Status: Final result Specimen: Blood from Arm, Left Updated: 04/05/25 1253     Sodium 138 mmol/L      Potassium 4.2 mmol/L      Chloride 106 mmol/L      CO2 25 mmol/L      ANION GAP 7 mmol/L      BUN 16 mg/dL      Creatinine 0.72 mg/dL      Glucose 88 mg/dL      Calcium 9.4 mg/dL      eGFR 94 ml/min/1.73sq m     Narrative:      [employer] guidelines for Chronic Kidney Disease (CKD):     Stage 1 with normal or high GFR (GFR > 90 mL/min/1.73 square meters)    Stage 2 Mild CKD (GFR = 60-89 mL/min/1.73  square meters)    Stage 3A Moderate CKD (GFR = 45-59 mL/min/1.73 square meters)    Stage 3B Moderate CKD (GFR = 30-44 mL/min/1.73 square meters)    Stage 4 Severe CKD (GFR = 15-29 mL/min/1.73 square meters)    Stage 5 End Stage CKD (GFR <15 mL/min/1.73 square meters)  Note: GFR calculation is accurate only with a steady state creatinine    CBC and differential [[id number]]  (Abnormal) Collected: 04/05/25 1231    Lab Status: Final result Specimen: Blood from Arm, Left Updated: 04/05/25 1235     WBC 10.83 Thousand/uL      RBC 4.46 Million/uL      Hemoglobin 14.2 g/dL      Hematocrit 42.3 %      MCV 95 fL      MCH 31.8 pg      MCHC 33.6 g/dL      RDW 11.8 %      MPV 9.1 fL      Platelets 277 Thousands/uL      nRBC 0 /100 WBCs      Segmented % 74 %      Immature Grans % 1 %      Lymphocytes % 19 %      Monocytes % 5 %      Eosinophils Relative 1 %      Basophils Relative 0 %      Absolute Neutrophils 8.12 Thousands/µL      Absolute Immature Grans 0.07 Thousand/uL      Absolute Lymphocytes 2.02 Thousands/µL      Absolute Monocytes 0.53 Thousand/µL      Eosinophils Absolute 0.05 Thousand/µL      Basophils Absolute 0.04 Thousands/µL             MRI cervical spine wo contrast   Final Interpretation by Enrique Douglass MD (04/05 1611)      Mild degenerative disc disease, most prominent at C3-C4 and C4-C5. No cord compression or cord signal abnormality.         Resident: JOSÉ CHRIS I, the attending radiologist, have reviewed the images and agree with the final report above.      Workstation performed: [id number]             Procedures    ED Medication and Procedure Management   Prior to Admission Medications   Prescriptions Last Dose Informant Patient Reported? Taking?   ALPRAZolam (XANAX) 0.5 mg tablet  Self No No   Sig: Take 1 tablet (0.5 mg total) by mouth 3 (three) times a day as needed for anxiety for up to 10 days   buPROPion HCl (WELLBUTRIN PO)  Self Yes No   Sig: Take by mouth   cholecalciferol (VITAMIN  D3) 1,000 units tablet  Self No No   Sig: Take 1 tablet (1,000 Units total) by mouth daily   escitalopram (LEXAPRO) 10 mg tablet  Self No No   Sig: Take 1 tablet (10 mg total) by mouth daily   Patient not taking: Reported on 7/28/2020   ibuprofen (MOTRIN) 800 mg tablet   No No   Sig: Take 1 tablet (800 mg total) by mouth every 8 (eight) hours as needed for mild pain   levothyroxine 125 mcg tablet  Self No No   Sig: Take 1 tablet (125 mcg total) by mouth daily in the early morning   Patient taking differently: Take 175 mcg by mouth daily in the early morning    lisinopril (ZESTRIL) 20 mg tablet  Self No No   Sig: Take 1 tablet (20 mg total) by mouth daily at bedtime   senna-docusate sodium (SENOKOT S) 8.6-50 mg per tablet  Self No No   Sig: Take 1 tablet by mouth daily at bedtime   Patient not taking: Reported on 7/28/2020      Facility-Administered Medications: None     Discharge Medication List as of 4/5/2025  4:45 PM        START taking these medications    Details   oxyCODONE (Roxicodone) 5 immediate release tablet Take 1 tablet (5 mg total) by mouth every 6 (six) hours as needed for severe pain for up to 10 days Max Daily Amount: 20 mg, Starting Sat 4/5/2025, Until Tue 4/15/2025 at 2359, Normal           CONTINUE these medications which have NOT CHANGED    Details   ALPRAZolam (XANAX) 0.5 mg tablet Take 1 tablet (0.5 mg total) by mouth 3 (three) times a day as needed for anxiety for up to 10 days, Starting Tue 11/26/2019, Until Mon 2/15/2021, No Print      buPROPion HCl (WELLBUTRIN PO) Take by mouth, Historical Med      cholecalciferol (VITAMIN D3) 1,000 units tablet Take 1 tablet (1,000 Units total) by mouth daily, Starting Wed 11/27/2019, Print      escitalopram (LEXAPRO) 10 mg tablet Take 1 tablet (10 mg total) by mouth daily, Starting Wed 11/27/2019, Normal      ibuprofen (MOTRIN) 800 mg tablet Take 1 tablet (800 mg total) by mouth every 8 (eight) hours as needed for mild pain, Starting Wed 4/2/2025, Normal       levothyroxine 125 mcg tablet Take 1 tablet (125 mcg total) by mouth daily in the early morning, Starting Wed 11/27/2019, Print      lisinopril (ZESTRIL) 20 mg tablet Take 1 tablet (20 mg total) by mouth daily at bedtime, Starting Tue 11/26/2019, Print      senna-docusate sodium (SENOKOT S) 8.6-50 mg per tablet Take 1 tablet by mouth daily at bedtime, Starting Tue 11/26/2019, Print           No discharge procedures on file.  ED SEPSIS DOCUMENTATION   Time reflects when diagnosis was documented in both MDM as applicable and the Disposition within this note       Time User Action Codes Description Comment    4/5/2025  4:38 PM Dewayne Martinez Add [M25.519] Shoulder pain                  Dewayne Martinez MD  04/05/25 2604